# Patient Record
Sex: FEMALE | Race: WHITE | NOT HISPANIC OR LATINO | URBAN - NONMETROPOLITAN AREA
[De-identification: names, ages, dates, MRNs, and addresses within clinical notes are randomized per-mention and may not be internally consistent; named-entity substitution may affect disease eponyms.]

---

## 2016-12-29 LAB
EXTERNAL ABO GROUPING: (no result)
EXTERNAL RH FACTOR: POSITIVE

## 2017-01-05 ENCOUNTER — ROUTINE PRENATAL (OUTPATIENT)
Dept: OBSTETRICS AND GYNECOLOGY | Facility: CLINIC | Age: 27
End: 2017-01-05

## 2017-01-05 ENCOUNTER — PROCEDURE VISIT (OUTPATIENT)
Dept: OBSTETRICS AND GYNECOLOGY | Facility: CLINIC | Age: 27
End: 2017-01-05

## 2017-01-05 VITALS — SYSTOLIC BLOOD PRESSURE: 146 MMHG | WEIGHT: 175 LBS | BODY MASS INDEX: 32.01 KG/M2 | DIASTOLIC BLOOD PRESSURE: 78 MMHG

## 2017-01-05 DIAGNOSIS — O20.0 THREATENED ABORTION IN FIRST TRIMESTER: Primary | ICD-10-CM

## 2017-01-05 DIAGNOSIS — O20.0 THREATENED ABORTION IN FIRST TRIMESTER: ICD-10-CM

## 2017-01-05 DIAGNOSIS — Z3A.01 LESS THAN 8 WEEKS GESTATION OF PREGNANCY: Primary | ICD-10-CM

## 2017-01-05 LAB
COLOR UR: YELLOW
GLUCOSE UR STRIP-MCNC: NEGATIVE MG/DL
NITRITE UR-MCNC: NEGATIVE MG/ML
PROT UR STRIP-MCNC: NEGATIVE MG/DL
RBC # UR STRIP: NEGATIVE /UL

## 2017-01-05 PROCEDURE — 99214 OFFICE O/P EST MOD 30 MIN: CPT | Performed by: OBSTETRICS & GYNECOLOGY

## 2017-01-05 PROCEDURE — 81002 URINALYSIS NONAUTO W/O SCOPE: CPT | Performed by: OBSTETRICS & GYNECOLOGY

## 2017-01-05 PROCEDURE — 76817 TRANSVAGINAL US OBSTETRIC: CPT | Performed by: OBSTETRICS & GYNECOLOGY

## 2017-01-05 RX ORDER — PRENATAL VIT NO.126/IRON/FOLIC 28MG-0.8MG
1 TABLET ORAL DAILY
COMMUNITY
End: 2023-03-17

## 2017-01-05 NOTE — PROGRESS NOTES
Chief Complaint   Patient presents with   • Routine Prenatal Visit     has been spotting off and on since Tuesday.        HPI: Mis is a  currently at 7w5d who today reports the following:  Nausea - YES; Vaginal bleeding -  YES; Heartburn - No.  Pt is here for f/u from previous ultrasound on 2016 which showed gest sac at 6 5/7 wks with irregular shape appeared to be collapsing; no definitive yolk sac seen; no adnexal masses seen and no free fluid. Pt presents today for f/u with complaints of spotting on 1/3/2016 with wiping and again yesterday.  Pt with mild cramps but not severe; relieved with flatus.  Pt with +breast tenderness and nausea but no emesis.  Pt had labs done on 2016 which are reviewed with hcg level 29,270; MBT A+; TSH 2.10 normal.  Pt has been trying to conceive since July.  Pt has been on PNVS.  ROS:  Vitals: See prenatal flowsheet   GI: Constipation - No; Diarrhea - No    Neuro: Headache - No; Visual change - No      EXAM:  Abdomen: See prenatal flowsheet   Urine glucose/protein: See prenatal flowsheet   Pelvic: See prenatal flowsheet     Prenatal Labs  No results found for: HGB, RUBELLAIGGIN, RUBELLASCRN, HEPBSAG, LABRPR, ABORH, ABO, RH, ABSCRN, LABANTI, ABID, QPMIJWH93, SQQ2FQN0, HEPCVIRUSABY, DTRGEKV4KO, GBSANTIGEN, URINECX    MDM:  Impression: Probable missed ab.   Tests done today: U/S for threatened ab   Topics discussed: Ab precautions given. Pt offered to repeat hcg now if desired; pt declines.  Given above hcg level and findings on TVS then prob missed ab.  Offered repeat scan as well in 1 wk but pt declines.  Pt to f/u in 1 wk with plan at that time; if no miscarriage then may repeat scan and/or hcg level prior to suction D&C if desired.  All questions answered.  Instructions and precautions given.  Pt will need HTN addressed as well.   Tests next visit: none   Next visit: See prenatal flowsheet

## 2017-01-05 NOTE — MR AVS SNAPSHOT
Mis Wyatt   2017 4:10 PM   Appointment    Dept Phone:  [phone]   Encounter #:  [id number]    Provider:  ULTRASOUND ROOM CRISTA MITCHELL   Department:  [facility] OBSTETRICS AND GYNECOLOGY                Your Full Care Plan              Your Updated Medication List          This list is accurate as of: 17  5:26 PM.  Always use your most recent med list.                prenatal (CLASSIC) vitamin 28-0.8 MG tablet tablet               Instructions     None    Patient Instructions History      Upcoming Appointments     Visit Type Date Time Department    OB FOLLOWUP 2017  3:20 PM [facility]    OB ULTRASOUND 2017  4:10 PM [facility]    GYN FOLLOW UP 2017  2:30 PM E [facility]RISTA [facility]      MyChart Signup     [employer] allows you to send messages to your doctor, view your test results, renew your prescriptions, schedule appointments, and more. To sign up, go to [employer] and click on the Sign Up Now link in the New User? box. Enter your [email or web address] Activation Code exactly as it appears below along with the last four digits of your Social Security Number and your Date of Birth () to complete the sign-up process. If you do not sign up before the expiration date, you must request a new code.    [email or web address] Activation Code: [email or web address]  Expires: 2017  5:09 PM    If you have questions, you can email [email or web address] or call [phone] to talk to our [email or web address] staff. Remember, [email or web address] is NOT to be used for urgent needs. For medical emergencies, dial 911.               Other Info from Your Visit           Your Appointments     2017  2:30 PM EST   GYN FOLLOW UP with Stormy Lee MD   [facility] OBSTETRICS AND GYNECOLOGY (--)    [address] [place] [id number] [phone]              Allergies     No Known Allergies      Vital Signs     Last Menstrual Period  Smoking Status                10/26/2016 (Approximate) Never Smoker

## 2017-01-05 NOTE — MR AVS SNAPSHOT
Mis Wyatt   2017 3:20 PM   Routine Prenatal    Dept Phone:  [phone]   Encounter #:  [id number]    Provider:  Stormy Lee MD   Department:  [facility] OBSTETRICS AND GYNECOLOGY                Your Full Care Plan              Your Updated Medication List          This list is accurate as of: 17  5:11 PM.  Always use your most recent med list.                prenatal (CLASSIC) vitamin 28-0.8 MG tablet tablet               We Performed the Following     POC Urinalysis Dipstick     US Ob 14 + Weeks Single or First Gestation       You Were Diagnosed With        Codes Comments    Less than 8 weeks gestation of pregnancy    -  Primary ICD-10-CM: Z3A.01  ICD-9-CM: V22.2     Threatened  in first trimester     ICD-10-CM: O20.0  ICD-9-CM: 640.03       Instructions     None    Patient Instructions History      Upcoming Appointments     Visit Type Date Time Department    OB FOLLOWUP 2017  3:20 PM [facility]    GYN FOLLOW UP 2017  2:30 PM E [facility]E [facility]      [employer] Signup     [facility] [employer] allows you to send messages to your doctor, view your test results, renew your prescriptions, schedule appointments, and more. To sign up, go to [employer] and click on the Sign Up Now link in the New User? box. Enter your [employer] IP Activation Code exactly as it appears below along with the last four digits of your Social Security Number and your Date of Birth () to complete the sign-up process. If you do not sign up before the expiration date, you must request a new code.    [employer]teen [employer] Activation Code: [email or web address]  Expires: 2017  5:09 PM    If you have questions, you can email [email or web address] or call [phone] to talk to our [employer] staff. Remember, [employer] is NOT to be used for urgent needs. For medical emergencies, dial 911.               Other Info from Your Visit           Your Appointments     ,    2:30 PM EST   GYN FOLLOW UP with Stormy Lee MD   [facility] OBSTETRICS AND GYNECOLOGY (--)    [address] [address] [phone]              Allergies     No Known Allergies      Reason for Visit     Routine Prenatal Visit has been spotting off and on since Tuesday.       Vital Signs     Blood Pressure Weight Last Menstrual Period Body Mass Index Smoking Status       146/78 175 lb (79.4 kg) 10/26/2016 (Approximate) 32.01 kg/m2 Never Smoker       Problems and Diagnoses Noted     Pregnancy    Threatened  in first trimester          Results     POC Urinalysis Dipstick      Component Value Standard Range & Units    Color Yellow Yellow, Straw, Dark Yellow, Lula    Glucose, UA Negative Negative, 1000 mg/dL (3+) mg/dL    Blood, UA Negative Negative    Protein, POC Negative Negative mg/dL    Nitrite, UA Negative Negative

## 2017-01-05 NOTE — PATIENT INSTRUCTIONS
"Miscarriage  A miscarriage is the sudden loss of an unborn baby (fetus) before the 20th week of pregnancy. Most miscarriages happen in the first 3 months of pregnancy. Sometimes, it happens before a woman even knows she is pregnant. A miscarriage is also called a \"spontaneous miscarriage\" or \"early pregnancy loss.\" Having a miscarriage can be an emotional experience. Talk with your caregiver about any questions you may have about miscarrying, the grieving process, and your future pregnancy plans.  CAUSES   · Problems with the fetal chromosomes that make it impossible for the baby to develop normally. Problems with the baby's genes or chromosomes are most often the result of errors that occur, by chance, as the embryo divides and grows. The problems are not inherited from the parents.  · Infection of the cervix or uterus.    · Hormone problems.    · Problems with the cervix, such as having an incompetent cervix. This is when the tissue in the cervix is not strong enough to hold the pregnancy.    · Problems with the uterus, such as an abnormally shaped uterus, uterine fibroids, or congenital abnormalities.    · Certain medical conditions.    · Smoking, drinking alcohol, or taking illegal drugs.    · Trauma.    Often, the cause of a miscarriage is unknown.   SYMPTOMS   · Vaginal bleeding or spotting, with or without cramps or pain.  · Pain or cramping in the abdomen or lower back.  · Passing fluid, tissue, or blood clots from the vagina.  DIAGNOSIS   Your caregiver will perform a physical exam. You may also have an ultrasound to confirm the miscarriage. Blood or urine tests may also be ordered.  TREATMENT   · Sometimes, treatment is not necessary if you naturally pass all the fetal tissue that was in the uterus. If some of the fetus or placenta remains in the body (incomplete miscarriage), tissue left behind may become infected and must be removed. Usually, a dilation and curettage (D and C) procedure is performed. " During a D and C procedure, the cervix is widened (dilated) and any remaining fetal or placental tissue is gently removed from the uterus.  · Antibiotic medicines are prescribed if there is an infection. Other medicines may be given to reduce the size of the uterus (contract) if there is a lot of bleeding.  · If you have Rh negative blood and your baby was Rh positive, you will need a Rh immunoglobulin shot. This shot will protect any future baby from having Rh blood problems in future pregnancies.  HOME CARE INSTRUCTIONS   · Your caregiver may order bed rest or may allow you to continue light activity. Resume activity as directed by your caregiver.  · Have someone help with home and family responsibilities during this time.    · Keep track of the number of sanitary pads you use each day and how soaked (saturated) they are. Write down this information.    · Do not use tampons. Do not douche or have sexual intercourse until approved by your caregiver.    · Only take over-the-counter or prescription medicines for pain or discomfort as directed by your caregiver.    · Do not take aspirin. Aspirin can cause bleeding.    · Keep all follow-up appointments with your caregiver.    · If you or your partner have problems with grieving, talk to your caregiver or seek counseling to help cope with the pregnancy loss. Allow enough time to grieve before trying to get pregnant again.    SEEK IMMEDIATE MEDICAL CARE IF:   · You have severe cramps or pain in your back or abdomen.  · You have a fever.  · You pass large blood clots (walnut-sized or larger) or tissue from your vagina. Save any tissue for your caregiver to inspect.    · Your bleeding increases.    · You have a thick, bad-smelling vaginal discharge.  · You become lightheaded, weak, or you faint.    · You have chills.    MAKE SURE YOU:  · Understand these instructions.  · Will watch your condition.  · Will get help right away if you are not doing well or get worse.     This  information is not intended to replace advice given to you by your health care provider. Make sure you discuss any questions you have with your health care provider.     Document Released: 06/13/2002 Document Revised: 04/14/2014 Document Reviewed: 02/05/2013  [employer] Interactive Patient Education ©2016 [employer]

## 2017-01-12 ENCOUNTER — OFFICE VISIT (OUTPATIENT)
Dept: OBSTETRICS AND GYNECOLOGY | Facility: CLINIC | Age: 27
End: 2017-01-12

## 2017-01-12 VITALS
WEIGHT: 173 LBS | BODY MASS INDEX: 29.53 KG/M2 | DIASTOLIC BLOOD PRESSURE: 64 MMHG | SYSTOLIC BLOOD PRESSURE: 124 MMHG | HEIGHT: 64 IN

## 2017-01-12 DIAGNOSIS — O02.1 MISSED AB: Primary | ICD-10-CM

## 2017-01-12 LAB
APPEARANCE UR: CLEAR
BASOPHILS # BLD AUTO: 0.05 THOUS (ref 0–0.2)
BASOPHILS NFR BLD AUTO: 0.5 % (ref 0–2.5)
BILIRUB UR QL STRIP: NEGATIVE
COLOR UR: YELLOW
CONV ABS IMM GRAN: 0.05 THOUS (ref 0–0.6)
CONV IMMATURE GRAN: 0.5 % (ref 0–2.5)
CONV PROTEIN IN URINE BY AUTOMATED TEST STRIP: NEGATIVE
CONV UROBILINOGEN IN URINE BY AUTOMATED TEST STRIP: 0.2
EOSINOPHIL # BLD AUTO: 0.09 THOUS (ref 0–0.7)
EOSINOPHIL # BLD AUTO: 0.9 % (ref 0–7)
ERYTHROCYTE [DISTWIDTH] IN BLOOD BY AUTOMATED COUNT: 12.3 % (ref 11.5–14.5)
GLUCOSE UR QL: NEGATIVE
HCT VFR BLD AUTO: 44 % (ref 37–47)
HGB BLD-MCNC: 14.3 G/DL (ref 12–16)
HGB UR QL STRIP: NORMAL
KETONES UR QL STRIP: NEGATIVE
LEUKOCYTE ESTERASE UR QL STRIP: NEGATIVE
LYMPHOCYTES # BLD AUTO: 2.45 THOUS (ref 0.6–3.4)
LYMPHOCYTES NFR BLD AUTO: 25.7 % (ref 10–50)
MCH RBC QN AUTO: 30 UUG (ref 27–31)
MCHC RBC AUTO-ENTMCNC: 32.9 G/DL (ref 30–37)
MCV RBC AUTO: 91.4 FL (ref 81–99)
MONOCYTES # BLD AUTO: 0.49 THOUS (ref 0–0.9)
MONOCYTES NFR BLD MANUAL: 5.1 % (ref 0–12)
NEUTROPHILS # BLD MANUAL: 6.42 THOUS (ref 2–6.9)
NEUTROPHILS NFR BLD AUTO: 67.3 % (ref 37–80)
NITRITE UR QL STRIP: NEGATIVE
PH UR: 5.5 [PH] (ref 5–8)
PLATELET # BLD AUTO: 318 THOUS (ref 130–400)
RBC # BLD AUTO: 4.76 M/UL (ref 4.2–5.4)
RBC #/AREA URNS HPF: NORMAL /[HPF] (ref 0–3)
SP GR UR: 1.02 (ref 1–1.03)
SQUAMOUS SPT QL MICRO: NORMAL (ref 0–3)
WBC # BLD AUTO: 9.6 THOUS (ref 4.8–10.8)
WBC #/AREA URNS HPF: NORMAL /[HPF] (ref 0–3)

## 2017-01-12 PROCEDURE — 99214 OFFICE O/P EST MOD 30 MIN: CPT | Performed by: OBSTETRICS & GYNECOLOGY

## 2017-01-12 PROCEDURE — 76815 OB US LIMITED FETUS(S): CPT | Performed by: OBSTETRICS & GYNECOLOGY

## 2017-01-12 NOTE — MR AVS SNAPSHOT
"                        Mis Wyatt   2017 2:30 PM   Office Visit    Dept Phone:  [phone]   Encounter #:  [id number]    Provider:  Stormy Lee MD   Department:  [facility] OBSTETRICS AND GYNECOLOGY                Your Full Care Plan              Your Updated Medication List          This list is accurate as of: 17  3:57 PM.  Always use your most recent med list.                prenatal (CLASSIC) vitamin 28-0.8 MG tablet tablet               We Performed the Following     US Ob Limited 1 + Fetuses       You Were Diagnosed With        Codes Comments    Missed ab    -  Primary ICD-10-CM: O02.1  ICD-9-CM: 632       Instructions     None    Patient Instructions History      Upcoming Appointments     Visit Type Date Time Department    GYN FOLLOW UP 2017  2:30 PM [facility]      Moni Signup     [employer] allows you to send messages to your doctor, view your test results, renew your prescriptions, schedule appointments, and more. To sign up, go to [employer] and click on the Sign Up Now link in the New User? box. Enter your Moni Activation Code exactly as it appears below along with the last four digits of your Social Security Number and your Date of Birth () to complete the sign-up process. If you do not sign up before the expiration date, you must request a new code.    Moni Activation Code: [email or web address]  Expires: 2017  5:09 PM    If you have questions, you can email [email or web address] or call [phone] to talk to our Moni staff. Remember, Moni is NOT to be used for urgent needs. For medical emergencies, dial 911.               Other Info from Your Visit           Allergies     No Known Allergies      Reason for Visit     Follow-up follow up miscarriage      Vital Signs     Blood Pressure Height Weight Last Menstrual Period Breastfeeding? Body Mass Index    124/64 64\" (162.6 cm) 173 lb (78.5 kg) " 10/26/2016 (Approximate) Unknown 29.7 kg/m2    Smoking Status                   Never Smoker           Problems and Diagnoses Noted     Missed ab    -  Primary

## 2017-01-12 NOTE — PATIENT INSTRUCTIONS
Incomplete Miscarriage  A miscarriage is the sudden loss of an unborn baby (fetus) before the 20th week of pregnancy. In an incomplete miscarriage, parts of the fetus or placenta (afterbirth) remain in the body.   Having a miscarriage can be an emotional experience. Talk with your health care provider about any questions you may have about miscarrying, the grieving process, and your future pregnancy plans.  CAUSES   · Problems with the fetal chromosomes that make it impossible for the baby to develop normally. Problems with the baby's genes or chromosomes are most often the result of errors that occur by chance as the embryo divides and grows. The problems are not inherited from the parents.  · Infection of the cervix or uterus.  · Hormone problems.  · Problems with the cervix, such as having an incompetent cervix. This is when the tissue in the cervix is not strong enough to hold the pregnancy.  · Problems with the uterus, such as an abnormally shaped uterus, uterine fibroids, or congenital abnormalities.  · Certain medical conditions.  · Smoking, drinking alcohol, or taking illegal drugs.  · Trauma.  SYMPTOMS   · Vaginal bleeding or spotting, with or without cramps or pain.  · Pain or cramping in the abdomen or lower back.  · Passing fluid, tissue, or blood clots from the vagina.  DIAGNOSIS   Your health care provider will perform a physical exam. You may also have an ultrasound to confirm the miscarriage. Blood or urine tests may also be ordered.  TREATMENT   · Usually, a dilation and curettage (D&C) procedure is performed. During a D&C procedure, the cervix is widened (dilated) and any remaining fetal or placental tissue is gently removed from the uterus.  · Antibiotic medicines are prescribed if there is an infection. Other medicines may be given to reduce the size of the uterus (contract) if there is a lot of bleeding.  · If you have Rh negative blood and your baby was Rh positive, you will need a Rho (D)  immune globulin shot. This shot will protect any future baby from having Rh blood problems in future pregnancies.  · You may be confined to bed rest. This means you should stay in bed and only get up to use the bathroom.  HOME CARE INSTRUCTIONS   · Rest as directed by your health care provider.  · Restrict activity as directed by your health care provider. You may be allowed to continue light activity if curettage was not done but you require further treatment.  · Keep track of the number of pads you use each day. Keep track of how soaked (saturated) they are. Record this information.  · Do not  use tampons.  · Do not douche or have sexual intercourse until approved by your health care provider.  · Keep all follow-up appointments for reevaluation and continuing management.  · Only take over-the-counter or prescription medicines for pain, fever, or discomfort as directed by your health care provider.  · Take antibiotic medicine as directed by your health care provider. Make sure you finish it even if you start to feel better.  SEEK IMMEDIATE MEDICAL CARE IF:   · You experience severe cramps in your stomach, back, or abdomen.  · You have an unexplained temperature (make sure to record these temperatures).  · You pass large clots or tissue (save these for your health care provider to inspect).  · Your bleeding increases.  · You become light-headed, weak, or have fainting episodes.  MAKE SURE YOU:   · Understand these instructions.  · Will watch your condition.  · Will get help right away if you are not doing well or get worse.     This information is not intended to replace advice given to you by your health care provider. Make sure you discuss any questions you have with your health care provider.     Document Released: 12/18/2006 Document Revised: 01/08/2016 Document Reviewed: 07/17/2014  [employer] Interactive Patient Education ©2016 [employer]

## 2017-01-12 NOTE — PROGRESS NOTES
17    Mis Wyatt    1990      Chief Complaint   Patient presents with   • Follow-up     follow up miscarriage       Patient is 26 y.o.  here for follow-up of miscarriage.  The patient had previously been seen with a collapsing gestational sac.  The patient reports the onset of heavy bleeding on -yesterday. The patient reports bleeding heavily until yesterday.  The patient reports the bleeding had stopped with recurrent spotting this a.m.  The patient has had lower abdominal cramping as well.  She denies any fever or chills.  The patient has passed small clots but is not aware of passing any tissue.  The patient denies any discharge with a foul odor.  The patient had had a previous transvaginal ultrasound on  which showed a gestational sac which appeared to be collapsing measuring approximately 6 5/7 wks gestation. The patient had an hCG level at that time of 29,207.  The patient had a follow-up scan on 1/3/2016 which confirmed continued gestational sac irregular in shape, collapsing.      History reviewed. No pertinent past medical history.        Current Outpatient Prescriptions on File Prior to Visit   Medication Sig Dispense Refill   • Prenatal Vit-Fe Fumarate-FA (PRENATAL, CLASSIC, VITAMIN) 28-0.8 MG tablet tablet Take 1 tablet by mouth Daily.       No current facility-administered medications on file prior to visit.          Past Surgical History   Procedure Laterality Date   • Tonsillectomy             Social History     Social History   • Marital status:      Spouse name: N/A   • Number of children: N/A   • Years of education: N/A     Social History Main Topics   • Smoking status: Never Smoker   • Smokeless tobacco: Never Used   • Alcohol use No   • Drug use: No   • Sexual activity: Yes     Partners: Male     Other Topics Concern   • None     Social History Narrative         Review of Systems   Constitutional: Negative.    HENT: Negative.    Eyes: Negative.    Respiratory:  "Negative.    Cardiovascular: Negative.    Gastrointestinal: Negative.    Endocrine: Negative.    Genitourinary: Positive for vaginal discharge and vaginal pain.   Musculoskeletal: Negative.    Allergic/Immunologic: Negative.    Neurological: Negative.    Hematological: Negative.    Psychiatric/Behavioral: Negative.                  Vitals:    01/12/17 1517   BP: 124/64   Weight: 173 lb (78.5 kg)   Height: 64\" (162.6 cm)       Physical Exam   Constitutional: She is oriented to person, place, and time. She appears well-developed and well-nourished. No distress.   HENT:   Head: Normocephalic and atraumatic.   Eyes: Conjunctivae are normal.   Neck: Normal range of motion. Neck supple. No thyromegaly present.   Cardiovascular: Regular rhythm and normal heart sounds.    Pulmonary/Chest: Effort normal and breath sounds normal.   Abdominal: Soft. Bowel sounds are normal. She exhibits no distension and no mass. There is no tenderness. There is no rebound and no guarding.   Neurological: She is alert and oriented to person, place, and time.   Skin: Skin is warm and dry. She is not diaphoretic.   Psychiatric: She has a normal mood and affect. Her behavior is normal.   Nursing note and vitals reviewed.        1. Missed ab  Patient with missed AB with continued products of conception on transvaginal ultrasound which was performed today.  The uterus has a 1.2 cm or 5-5/7 weeks gestational sac seen in the fundus which appears much smaller than prior scan but still high in the fundus.  Cervix appears normal.  No adnexal masses seen and no free fluid.  The various options were discussed with the patient and decision was made to proceed with suction D&C.  Risks, complications, benefits, and other alternatives were discussed with the patient.  All questions have been answered.  And the patient is in agreement with the above plan.  - US Ob Limited 1 + Fetuses    Requested Prescriptions      No prescriptions requested or ordered in this " encounter       No Follow-up on file.    Stormy Lee MD

## 2017-01-13 ENCOUNTER — HOSPITAL ENCOUNTER (OUTPATIENT)
Dept: OTHER | Facility: HOSPITAL | Age: 27
Discharge: HOME | End: 2017-01-13
Attending: OBSTETRICS & GYNECOLOGY

## 2017-01-13 ENCOUNTER — PREP FOR SURGERY (OUTPATIENT)
Dept: OBSTETRICS AND GYNECOLOGY | Facility: CLINIC | Age: 27
End: 2017-01-13

## 2017-01-13 ENCOUNTER — OUTSIDE FACILITY SERVICE (OUTPATIENT)
Dept: OBSTETRICS AND GYNECOLOGY | Facility: CLINIC | Age: 27
End: 2017-01-13

## 2017-01-13 PROCEDURE — 59812 TREATMENT OF MISCARRIAGE: CPT | Performed by: OBSTETRICS & GYNECOLOGY

## 2017-01-13 NOTE — H&P
17     Mis Wyatt     1990             Chief Complaint   Patient presents with   • Follow-up       follow up miscarriage         Patient is 26 y.o.  here for follow-up of miscarriage. The patient had previously been seen with a collapsing gestational sac. The patient reports the onset of heavy bleeding on -yesterday. The patient reports bleeding heavily until yesterday. The patient reports the bleeding had stopped with recurrent spotting this a.m. The patient has had lower abdominal cramping as well. She denies any fever or chills. The patient has passed small clots but is not aware of passing any tissue. The patient denies any discharge with a foul odor. The patient had had a previous transvaginal ultrasound on  which showed a gestational sac which appeared to be collapsing measuring approximately 6 5/7 wks gestation. The patient had an hCG level at that time of 29,207. The patient had a follow-up scan on 1/3/2016 which confirmed continued gestational sac irregular in shape, collapsing.        History reviewed. No pertinent past medical history.           Current Outpatient Prescriptions on File Prior to Visit   Medication Sig Dispense Refill   • Prenatal Vit-Fe Fumarate-FA (PRENATAL, CLASSIC, VITAMIN) 28-0.8 MG tablet tablet Take 1 tablet by mouth Daily.          No current facility-administered medications on file prior to visit.                   Past Surgical History   Procedure Laterality Date   • Tonsillectomy                   Social History      Social History   • Marital status:        Spouse name: N/A   • Number of children: N/A   • Years of education: N/A            Social History Main Topics   • Smoking status: Never Smoker   • Smokeless tobacco: Never Used   • Alcohol use No   • Drug use: No   • Sexual activity: Yes       Partners: Male           Other Topics Concern   • None      Social History Narrative            Review of Systems   Constitutional: Negative.  "  HENT: Negative.   Eyes: Negative.   Respiratory: Negative.   Cardiovascular: Negative.   Gastrointestinal: Negative.   Endocrine: Negative.   Genitourinary: Positive for vaginal discharge and vaginal pain.   Musculoskeletal: Negative.   Allergic/Immunologic: Negative.   Neurological: Negative.   Hematological: Negative.   Psychiatric/Behavioral: Negative.                            Vitals:     01/12/17 1517   BP: 124/64   Weight: 173 lb (78.5 kg)   Height: 64\" (162.6 cm)         Physical Exam   Constitutional: She is oriented to person, place, and time. She appears well-developed and well-nourished. No distress.   HENT:   Head: Normocephalic and atraumatic.   Eyes: Conjunctivae are normal.   Neck: Normal range of motion. Neck supple. No thyromegaly present.   Cardiovascular: Regular rhythm and normal heart sounds.   Pulmonary/Chest: Effort normal and breath sounds normal.   Abdominal: Soft. Bowel sounds are normal. She exhibits no distension and no mass. There is no tenderness. There is no rebound and no guarding.   Neurological: She is alert and oriented to person, place, and time.   Skin: Skin is warm and dry. She is not diaphoretic.   Psychiatric: She has a normal mood and affect. Her behavior is normal.   Nursing note and vitals reviewed.           1. Missed ab  Patient with missed AB with continued products of conception on transvaginal ultrasound which was performed today. The uterus has a 1.2 cm or 5-5/7 weeks gestational sac seen in the fundus which appears much smaller than prior scan but still high in the fundus. Cervix appears normal. No adnexal masses seen and no free fluid. The various options were discussed with the patient and decision was made to proceed with suction D&C. Risks, complications, benefits, and other alternatives were discussed with the patient. All questions have been answered. And the patient is in agreement with the above plan.  - US Ob Limited 1 + Fetuses     Requested " Prescriptions        No prescriptions requested or ordered in this encounter         No Follow-up on file.     Stormy Lee MD

## 2017-01-16 NOTE — OP NOTE
[facility]     [address] [place] [address]     [phone]         OPERATIVE REPORT     [id number]         Signed        PATIENT NAME:  JONAS JASSO MRN:  [id number]    :  1990 ACCT#:  [id number]    ATTENDING:  THUAN DEVI MD ADMIT DATE:      PRIMARY CARE:  NONE LOCATION:  [facility]        CC:  THUAN DEVI MD; NONE         DATE OF PROCEDURE:  2017          OPERATIVE REPORT          PREOPERATIVE DIAGNOSIS: Missed .          POSTOPERATIVE DIAGNOSIS: Missed .          SURGEON: Thuan Devi MD           PROCEDURE PERFORMED: Suction dilatation and curettage.           ANESTHESIA: IV sedation with 1% lidocaine paracervical block.          ESTIMATED BLOOD LOSS: Less than 10 mL.           COMPLICATIONS: None.          INDICATIONS: The patient is a 26-year-old female, G1. Patient was at 8-5/7      weeks' gestation. Patient has been followed with serial ultrasounds with an      abnormal collapsing gestational sac with an HCG level of over 29,000. Patient      had onset of heavy bleeding approximately 1 week ago with significant      cramping. The patient had a repeat ultrasound which showed continued products      of conception in the fundus of the uterus at 1 week, and decision is made to      proceed with suction dilatation and curettage. Risks, complications, and      benefits, as well as other alternative treatments, were discussed with the      patient, and the patient elects to proceed with the above.            DESCRIPTION OF PROCEDURE: After adequate dosing of her anesthesia, the      patient had been prepped and draped in the usual sterile fashion. She was      placed in the dorsal lithotomy position using Roney stirrups. The bladder had      been drained with a red rubber catheter. Weighted speculum was placed in the      vagina. Anterior lip of the cervix was grasped with a single-tooth tenaculum.      Cervix was injected at the 3 and 9-o'clock positions  with 1% lidocaine plain      without any complications. The cervix was dilated completely with Contreras      dilators. Using a size 8 curette, suction curettage was performed with      removal of products of conception followed by sharp curettings with a good      cry throughout. Cervical tenaculum was removed. Cervix was noted to be      hemostatic at the end of the procedure after application of 2-0 chromic in a      figure-of-eight fashion. All instrument and sponge counts were correct at the      end of the procedure. The patient tolerated the procedure well. There were no      complications. She was taken to postoperative recovery room in stable      condition.                 Thuan Devi M.D.     GL/so 01/14/2017 20:44:26     so 01/14/2017 22:33:44     Job ID:   [id number]     Document ID: [id number]     Revised:  0     cc:              DICTATED BY:      THUAN DEVI MD    DICTATED DATE/TIME:      01/14/17 2044    TRANSCRIBED DATE/TIME:      01/14/17 2239        SIGNED:          THUAN DEVI MD       SIGNED DATE/TIME:      01/16/17 0719

## 2017-01-20 ENCOUNTER — OFFICE VISIT (OUTPATIENT)
Dept: OBSTETRICS AND GYNECOLOGY | Facility: CLINIC | Age: 27
End: 2017-01-20

## 2017-01-20 VITALS
BODY MASS INDEX: 33.23 KG/M2 | SYSTOLIC BLOOD PRESSURE: 150 MMHG | DIASTOLIC BLOOD PRESSURE: 50 MMHG | HEIGHT: 61 IN | WEIGHT: 176 LBS

## 2017-01-20 DIAGNOSIS — Z98.890 STATUS POST D&C: Primary | ICD-10-CM

## 2017-01-20 PROCEDURE — 99024 POSTOP FOLLOW-UP VISIT: CPT | Performed by: PHYSICIAN ASSISTANT

## 2017-01-20 RX ORDER — METRONIDAZOLE 500 MG/1
TABLET ORAL
Refills: 0 | COMMUNITY
Start: 2017-01-13 | End: 2023-03-17

## 2017-01-20 NOTE — PROGRESS NOTES
"Subjective   Chief Complaint   Patient presents with   • Post-op Follow-up     2017 Suction D&C     Visit Vitals   • /50   • Ht 61\" (154.9 cm)   • Wt 176 lb (79.8 kg)   • LMP 10/26/2016 (Approximate)   • BMI 33.25 kg/m2      Mis Wyatt is a 26 y.o. year old  presenting to be seen for post op visit  Patient is 1 week post op D&C for incomplete SAB  Patient is doing well--no bleeding or spotting  Normal bowel and bladder function  Path POC  History reviewed. No pertinent past medical history.     Current Outpatient Prescriptions:   •  metroNIDAZOLE (FLAGYL) 500 MG tablet, , Disp: , Rfl: 0  •  Prenatal Vit-Fe Fumarate-FA (PRENATAL, CLASSIC, VITAMIN) 28-0.8 MG tablet tablet, Take 1 tablet by mouth Daily., Disp: , Rfl:    No Known Allergies   Past Surgical History   Procedure Laterality Date   • Tonsillectomy     • Other surgical history  2017     Suction D&C      Social History     Social History   • Marital status:      Spouse name: N/A   • Number of children: N/A   • Years of education: N/A     Occupational History   • Not on file.     Social History Main Topics   • Smoking status: Never Smoker   • Smokeless tobacco: Never Used   • Alcohol use No   • Drug use: No   • Sexual activity: Yes     Partners: Male     Birth control/ protection: None     Other Topics Concern   • Not on file     Social History Narrative      Family History   Problem Relation Age of Onset   • Hypertension Father                 Objective     Physical Exam  Mis was seen today for post-op follow-up.    Diagnoses and all orders for this visit:    Status post D&C             This note was electronically signed.    Keli Adrian PA-C   2017  "

## 2017-01-20 NOTE — PATIENT INSTRUCTIONS
No conception for 3 months  Will continue prenatal vits  She declines contraception- will use condoms

## 2017-01-20 NOTE — MR AVS SNAPSHOT
Mis Wyatt   2017 10:40 AM   Office Visit    Dept Phone:  [phone]   Encounter #:  [id number]    Provider:  Keli Adrian PA-C   Department:  [facility]                Your Full Care Plan              Your Updated Medication List          This list is accurate as of: 17 11:21 AM.  Always use your most recent med list.                metroNIDAZOLE 500 MG tablet   Commonly known as:  FLAGYL       prenatal (CLASSIC) vitamin 28-0.8 MG tablet tablet               You Were Diagnosed With        Codes Comments    Status post D&C    -  Primary ICD-10-CM: Z98.890  ICD-9-CM: V45.89       Instructions    No conception for 3 months  Will continue prenatal vits  She declines contraception- will use condoms     Patient Instructions History      Upcoming Appointments     Visit Type Date Time Department    POST-OP 2017 10:40 AM [facility]      medidametrics Signup     [facility] medidametrics allows you to send messages to your doctor, view your test results, renew your prescriptions, schedule appointments, and more. To sign up, go to [employer] and click on the Sign Up Now link in the New User? box. Enter your medidametrics Activation Code exactly as it appears below along with the last four digits of your Social Security Number and your Date of Birth () to complete the sign-up process. If you do not sign up before the expiration date, you must request a new code.    medidametrics Activation Code: [email or web address]  Expires: 2017  5:36 AM    If you have questions, you can email [email or web address] or call [phone] to talk to our medidametrics staff. Remember, medidametrics is NOT to be used for urgent needs. For medical emergencies, dial 911.               Other Info from Your Visit           Allergies     No Known Allergies      Reason for Visit     Post-op Follow-up 2017 Suction D&C      Vital Signs     Blood  "Pressure Height Weight Last Menstrual Period Body Mass Index Smoking Status    150/50 61\" (154.9 cm) 176 lb (79.8 kg) 10/26/2016 (Approximate) 33.25 kg/m2 Never Smoker      Problems and Diagnoses Noted     Status post dilation and curettage    -  Primary        "

## 2018-01-11 RX ORDER — ACETAMINOPHEN AND CODEINE PHOSPHATE 120; 12 MG/5ML; MG/5ML
SOLUTION ORAL
Qty: 28 TABLET | Refills: 10 | OUTPATIENT
Start: 2018-01-11

## 2018-02-14 RX ORDER — ACETAMINOPHEN AND CODEINE PHOSPHATE 120; 12 MG/5ML; MG/5ML
SOLUTION ORAL
Qty: 28 TABLET | Refills: 10 | OUTPATIENT
Start: 2018-02-14

## 2022-07-19 ENCOUNTER — TRANSCRIBE ORDERS (OUTPATIENT)
Dept: ADMINISTRATIVE | Facility: HOSPITAL | Age: 32
End: 2022-07-19

## 2022-07-19 DIAGNOSIS — G89.29 CHRONIC RIGHT UPPER QUADRANT PAIN: ICD-10-CM

## 2022-07-19 DIAGNOSIS — R10.11 CHRONIC RIGHT UPPER QUADRANT PAIN: ICD-10-CM

## 2022-07-19 DIAGNOSIS — R10.11 ABDOMINAL PAIN, RIGHT UPPER QUADRANT: Primary | ICD-10-CM

## 2022-07-20 ENCOUNTER — HOSPITAL ENCOUNTER (OUTPATIENT)
Dept: ULTRASOUND IMAGING | Facility: HOSPITAL | Age: 32
Discharge: HOME | End: 2022-07-20
Admitting: NURSE PRACTITIONER

## 2022-07-20 DIAGNOSIS — G89.29 CHRONIC RIGHT UPPER QUADRANT PAIN: ICD-10-CM

## 2022-07-20 DIAGNOSIS — R10.11 ABDOMINAL PAIN, RIGHT UPPER QUADRANT: ICD-10-CM

## 2022-07-20 DIAGNOSIS — R10.11 CHRONIC RIGHT UPPER QUADRANT PAIN: ICD-10-CM

## 2022-07-20 PROCEDURE — 76705 ECHO EXAM OF ABDOMEN: CPT

## 2022-08-08 NOTE — PROGRESS NOTES
Patient: Mis Wyatt    YOB: 1990    Date: 08/10/2022    Primary Care Provider: Dixie Patrick APRN    Chief Complaint   Patient presents with   • Abdominal Pain       SUBJECTIVE:    History of present illness:  I saw the patient in the office today as a consultation for evaluation and treatment of abdominal pain.  The patient had an ultrasound that showed gallstones. States the pain has been going on for years.  Patient has several attacks, daily once a week.  Pain is minimal right shoulder.  Significant fatty food intolerance with bloating and pressure.  No significant heartburn symptoms.  Ultrasound indicates cholelithiasis.    The following portions of the patient's history were reviewed and updated as appropriate: allergies, current medications, past family history, past medical history, past social history, past surgical history and problem list.    Review of Systems   Constitutional: Negative for chills, fever and unexpected weight change.   HENT: Negative for hearing loss, trouble swallowing and voice change.    Eyes: Negative for visual disturbance.   Respiratory: Negative for apnea, cough, chest tightness, shortness of breath and wheezing.    Cardiovascular: Negative for chest pain, palpitations and leg swelling.   Gastrointestinal: Positive for abdominal pain. Negative for abdominal distention, anal bleeding, blood in stool, constipation, diarrhea, nausea, rectal pain and vomiting.   Endocrine: Negative for cold intolerance and heat intolerance.   Genitourinary: Negative for difficulty urinating, dysuria and flank pain.   Musculoskeletal: Negative for back pain and gait problem.   Skin: Negative for color change, rash and wound.   Neurological: Negative for dizziness, syncope, speech difficulty, weakness, light-headedness, numbness and headaches.   Hematological: Negative for adenopathy. Does not bruise/bleed easily.   Psychiatric/Behavioral: Negative for confusion. The patient  "is not nervous/anxious.        History:  Past Medical History:   Diagnosis Date   • Hypertension        Past Surgical History:   Procedure Laterality Date   • OTHER SURGICAL HISTORY  01/13/2017    Suction D&C   • TONSILLECTOMY         Family History   Problem Relation Age of Onset   • Diabetes Father    • Hypertension Father        Social History     Tobacco Use   • Smoking status: Never Smoker   • Smokeless tobacco: Never Used   Vaping Use   • Vaping Use: Never used   Substance Use Topics   • Alcohol use: No   • Drug use: No       Allergies:  No Known Allergies    Medications:    Current Outpatient Medications:   •  azelastine (ASTELIN) 0.1 % nasal spray, INHALE 1 OR 2 SPRAYS IN EACH NOSTRIL ONE TO  TWO TIMES A DAY, Disp: , Rfl:   •  hydroCHLOROthiazide (HYDRODIURIL) 25 MG tablet, Take 25 mg by mouth Daily., Disp: , Rfl:   •  levocetirizine (XYZAL) 5 MG tablet, , Disp: , Rfl:   •  metroNIDAZOLE (FLAGYL) 500 MG tablet, , Disp: , Rfl: 0  •  olopatadine (PATANOL) 0.1 % ophthalmic solution, INSTILL 1 DROP INTO THE AFFECTED EYE(S) TWO TIMES A DAY AS DIRECTED, Disp: , Rfl:   •  Prenatal Vit-Fe Fumarate-FA (PRENATAL, CLASSIC, VITAMIN) 28-0.8 MG tablet tablet, Take 1 tablet by mouth Daily., Disp: , Rfl:   •  Sharobel 0.35 MG tablet, Take 1 tablet by mouth Daily., Disp: , Rfl:     OBJECTIVE:    Vital Signs:   Vitals:    08/10/22 0831   Pulse: (!) 140   Resp: 18   Temp: 97.6 °F (36.4 °C)   TempSrc: Temporal   SpO2: 97%   Weight: 81.2 kg (179 lb)   Height: 154.9 cm (61\")       Physical Exam:   General Appearance:    Alert, cooperative, in no acute distress   Head:    Normocephalic, without obvious abnormality, atraumatic   Eyes:            Lids and lashes normal, conjunctivae and sclerae normal, no   icterus, no pallor, corneas clear, PERRLA   Ears:    Ears appear intact with no abnormalities noted   Throat:   No oral lesions, no thrush, oral mucosa moist   Neck:   No adenopathy, supple, trachea midline, no thyromegaly, no   " carotid bruit, no JVD   Lungs:     Clear to auscultation,respirations regular, even and                  unlabored    Heart:    Regular rhythm and normal rate, normal S1 and S2, no            murmur   Abdomen:     no masses, no organomegaly, soft non-tender, non-distended, no guarding, there is evidence of right upper quadrant tenderness.  No abdominal wall hernias or inguinal hernias.    Extremities:   Moves all extremities well, no edema, no cyanosis, no             redness   Pulses:   Pulses palpable and equal bilaterally   Skin:   No bleeding, bruising or rash   Lymph nodes:   No palpable adenopathy   Neurologic:   Cranial nerves 2 - 12 grossly intact, sensation intact      Results Review:   I reviewed the patient's new clinical results.    Review of Systems was reviewed and confirmed as accurate as documented by the MA.    ASSESSMENT/PLAN:    1. Calculus of gallbladder with chronic cholecystitis without obstruction    2. Right upper quadrant abdominal pain        I had a detailed and extensive discussion with the patient in the office and they understand that they need to undergo laparoscopic cholecystectomy with intraoperative cholangiography or possible open cholecystectomy. Full risks and benefits of operative versus nonoperative intervention were discussed with the patient and these included things such as nonresolution of symptoms and possible worsening of symptoms without surgical intervention versus infection, bleeding, open cholecystectomy, common bile duct injury, postoperative biliary leakage, need for drain placement, possible inability to perform cholangiography due to inflammation, postoperative abscess, etc with surgical intervention. The patient understands, agrees, and wishes to proceed with the surgical treatment plan as mentioned above. The patient had no questions for me at the end of the discussion.  I did draw a picture of the anatomy for the patient and used this in my informed consent.   Patient maintained a low-fat diet in interim.     I discussed the patients findings and my recommendations with patient.    Electronically signed by Emiliano Matias MD  08/10/22

## 2022-08-10 ENCOUNTER — OFFICE VISIT (OUTPATIENT)
Dept: SURGERY | Facility: CLINIC | Age: 32
End: 2022-08-10

## 2022-08-10 VITALS
RESPIRATION RATE: 18 BRPM | TEMPERATURE: 97.6 F | HEART RATE: 140 BPM | OXYGEN SATURATION: 97 % | WEIGHT: 179 LBS | BODY MASS INDEX: 33.79 KG/M2 | HEIGHT: 61 IN

## 2022-08-10 DIAGNOSIS — R10.11 RIGHT UPPER QUADRANT ABDOMINAL PAIN: ICD-10-CM

## 2022-08-10 DIAGNOSIS — Z01.818 PREOP TESTING: Primary | ICD-10-CM

## 2022-08-10 DIAGNOSIS — K80.10 CALCULUS OF GALLBLADDER WITH CHRONIC CHOLECYSTITIS WITHOUT OBSTRUCTION: Primary | ICD-10-CM

## 2022-08-10 PROCEDURE — 99203 OFFICE O/P NEW LOW 30 MIN: CPT | Performed by: SURGERY

## 2022-08-10 RX ORDER — LEVOCETIRIZINE DIHYDROCHLORIDE 5 MG/1
TABLET, FILM COATED ORAL
COMMUNITY
Start: 2022-07-31

## 2022-08-10 RX ORDER — OLOPATADINE HYDROCHLORIDE 1 MG/ML
SOLUTION/ DROPS OPHTHALMIC
COMMUNITY
Start: 2022-06-10

## 2022-08-10 RX ORDER — NORETHINDRONE 0.35 MG
1 KIT ORAL DAILY
COMMUNITY
Start: 2022-05-26

## 2022-08-10 RX ORDER — HYDROCHLOROTHIAZIDE 25 MG/1
25 TABLET ORAL DAILY
COMMUNITY
Start: 2022-06-10 | End: 2023-03-17 | Stop reason: ALTCHOICE

## 2022-08-10 RX ORDER — AZELASTINE 1 MG/ML
SPRAY, METERED NASAL
COMMUNITY
Start: 2022-06-10 | End: 2023-03-17 | Stop reason: SDUPTHER

## 2022-08-11 ENCOUNTER — TELEPHONE (OUTPATIENT)
Dept: SURGERY | Facility: CLINIC | Age: 32
End: 2022-08-11

## 2022-08-11 ENCOUNTER — PATIENT ROUNDING (BHMG ONLY) (OUTPATIENT)
Dept: SURGERY | Facility: CLINIC | Age: 32
End: 2022-08-11

## 2022-08-11 NOTE — PROGRESS NOTES
August, 10, 2022     Hello, may I speak with Mis Wyatt?    My name is ERNA WALTON      I am  with [facility]  [address]  [place] [address] [phone].    Before we get started may I verify your date of birth? 1990    I am calling to officially welcome you to our practice and ask about your recent visit. Is this a good time to talk? yes    Tell me about your visit with us. What things went well?  EVERYTHING WAS VERY GOOD.       We're always looking for ways to make our patients' experiences even better. Do you have recommendations on ways we may improve?  no    Overall were you satisfied with your first visit to our practice? yes       I appreciate you taking the time to speak with me today. Is there anything else I can do for you? no      Thank you, and have a great day.

## 2022-08-16 ENCOUNTER — OUTSIDE FACILITY SERVICE (OUTPATIENT)
Dept: SURGERY | Facility: CLINIC | Age: 32
End: 2022-08-16

## 2022-08-16 DIAGNOSIS — K81.9 CHOLECYSTITIS: Primary | ICD-10-CM

## 2022-08-16 PROCEDURE — 47563 LAPARO CHOLECYSTECTOMY/GRAPH: CPT | Performed by: SURGERY

## 2022-08-16 RX ORDER — HYDROCODONE BITARTRATE AND ACETAMINOPHEN 7.5; 325 MG/1; MG/1
1 TABLET ORAL EVERY 6 HOURS PRN
Qty: 14 TABLET | Refills: 0 | Status: SHIPPED | OUTPATIENT
Start: 2022-08-16 | End: 2023-03-17

## 2022-08-19 ENCOUNTER — TELEPHONE (OUTPATIENT)
Dept: SURGERY | Facility: CLINIC | Age: 32
End: 2022-08-19

## 2022-08-19 NOTE — TELEPHONE ENCOUNTER
[facility] [address] [facility] initial consultation note    Chuy Car                                                Chief complaint: Chuy Car is a 27 y. o.oldmale came for further evaluation regarding his ? Nocturnal seizures and sleep apnea  with referral from Ms. Elba Claros CNP/Dr. Jeannine Kincaid MD      [place]:    Sleep/Wake schedule:  Usual time to go to bed during the work/regular day of week: 3 AM.  He tried to go to bed at 11 PM and not able to go to sleep until 3 AM.  Usual time to wake up during the work//regular day of week: 7 AM.  He wakes up at 7 AM to take care of her children and send them to school. His children are 1 year, 5-year and 9years old. His 3year-old child (daughter) sleeps in the same bedroom as his. Over the weekends his sleep schedule:   [x]phase delayed. He feels the same on the weekends despite sleeping long time. He usually falls a sleep in less than: It takes up to 2 hours to go to sleep. He takes naps: Yes. Number of naps per week: 1 time. He takes naps especially when he had migraine headaches  During each nap he spends a total of: 2 hours  The naps were reported as refreshing: NO    Sleep Hygiene:    Is the temperature and evironment in his bed room is acceptable to him: Yes. He watches Television in his bed room: No.  He read books, study, pay bills etc in the bed: No.  Frequency He wake up during night/sleep: 5-6 times  Majority of nocturnal awakenings are for urination: Yes. He wakes up 1 time to go to restroom. Difficulty in falling back to sleep after nocturnal awakenings: No  .  Do you drink coffee: Yes. He drinks 2 cups of coffee per day. He drinks coffee in the morning. Do you drink caffeinated beverages i.e sodas: Yes. He drinks 2 cans of [employer] [facility] per day.   He drinks his last caffeinated soda around 5 PM.  Do you drink tea:No.   Do you drink alcoholic beverages: No.  History of recreational Pt is s/p anais guzman done 08/16/2022, she wanted to know if it is ok to take Mucinex and her multivitamin, I told her it is ok to take as directed.   syndrome : NO.     History regarding old sleep studies:  Prior history of sleep study: No.  Using CPAP device: No.  Currently using home Oxygen: NO.        Patient considerations:  Is the patient is ambulatory: Yes  Patient is currently using: None of these Wheelchair, Lynnda Leventhal or [employer]. Para/Quadriplegic: NO  Hearing deficit : NO  Claustrophobic: NO  MDD : NO  Blind: NO  Incontinent: NO  Para/Quadraplegi: NO.   Need transportation to and from Sleep Center:NO      Social History:  Social History     Tobacco Use    Smoking status: Never Smoker    Smokeless tobacco: Never Used   Vaping Use    Vaping Use: Never used   Substance Use Topics    Alcohol use: Never    Drug use: Never   . He is currently working: No.  [x]Disabled. He is currently on disability due to seizures.                            Past Medical History:   Diagnosis Date    Hypertension     Seizures ([facility] [address]     Type 2 diabetes mellitus ([facility] [address]        Past Surgical History:   Procedure Laterality Date    FOOT SURGERY Right     benign mass    GASTRIC FUNDOPLICATION  43/41/0814    Dr. Laurie Sadler (in care everywhere)    HERNIA REPAIR N/A 8/11/2021    LAPAROSCOPIC INCISIONAL HERNIA REPAIR WITH MESH, POSS OPEN performed by Jacquelyn Renner DO at [facility]      left arm mass removed-- Dr. Laurie Sadler       Allergies   Allergen Reactions    Dust Mite Extract     Gabapentin Hives    Mixed Grasses     Molds & Smuts        Current Outpatient Medications   Medication Sig Dispense Refill    montelukast (SINGULAIR) 10 MG tablet Take 10 mg by mouth daily      metoprolol succinate (TOPROL XL) 25 MG extended release tablet Take 25 mg by mouth daily      omeprazole (PRILOSEC) 20 MG delayed release capsule Take 40 mg by mouth daily      insulin glargine (LANTUS) 100 UNIT/ML injection vial Inject into the skin nightly 42 in morning 32 at night      insulin lispro (HUMALOG) 100 UNIT/ML injection vial Inject into the skin 3 times daily (before meals)      tiZANidine (ZANAFLEX) 4 MG tablet Take 4 mg by mouth every 6 hours as needed      ondansetron (ZOFRAN-ODT) 4 MG disintegrating tablet Take 4 mg by mouth every 8 hours as needed for Nausea or Vomiting      acetaminophen (TYLENOL) 325 MG tablet Take 650 mg by mouth every 6 hours as needed for Pain      ibuprofen (ADVIL;MOTRIN) 200 MG tablet Take 200 mg by mouth every 6 hours as needed for Pain       No current facility-administered medications for this visit. Family History   Problem Relation Age of Onset    Diabetes Mother     No Known Problems Father     Irritable Bowel Syndrome Sister    [facility] Migraines Sister         Review of Systems:   General/Constitutional: No recent loss of weight or appetite changes. No fever or chills. HENT: Negative. Eyes: Negative. Upper respiratory tract: No nasal stuffiness or post nasal drip. Lower respiratory tract/ lungs: No cough or sputum production. No hemoptysis. Cardiovascular: No palpitations or chest pain. Gastrointestinal: No nausea or vomiting. Neurological: No focal neurologiacal weakness. Extremities: No edema. Musculoskeletal: No complaints. Genitourinary: No complaints. Hematological: Negative. Psychiatric/Behavioral: Negative. Skin: No itching. /86 (Site: Right Upper Arm, Position: Sitting, Cuff Size: Medium Adult)   Pulse 84   Temp 98.1 °F (36.7 °C) (Skin)   Ht 6' 2\" (1.88 m)   Wt 257 lb 9.6 oz (116.8 kg)   SpO2 98%   BMI 33.07 kg/m²   Mallampati airway Class: 4  Neck Circumference: 18.75 inches  [place] sleepiness score 11/12/21:1  ( On further questioning he gives a history of hypersomnia ( Excessive daytime sleepiness) with daytime sleepy attacks. No reported motor vehicle accidents due to his sleepiness). SAQLI:50      Physical Exam   Nursing note and vitals reviewed. Constitutional: Patient appears moderately built and moderately nourished. No distress.  Patient is oriented to person, place, and time.  HENT:   Head: Normocephalic and atraumatic. Right Ear: External ear normal.   Left Ear: External ear normal.   Mouth/Throat: Oropharynx is clear and moist.  No oral thrush. Eyes: Conjunctivae are normal. Pupils are equal, round, and reactive to light. No scleral icterus. Neck: Neck supple. No JVD present. No tracheal deviation present. Cardiovascular: Normal rate, regular rhythm, normal heart sounds. No murmur heard. Pulmonary/Chest: Effort normal and breath sounds normal. No stridor. No respiratory distress. No wheezes. No rales. Patient exhibits no tenderness. Abdominal: Soft. Patient exhibits no distension. No tenderness. Musculoskeletal: Normal range of motion. Extremities: Patient exhibits no edema and no tenderness. Lymphadenopathy:  No cervical adenopathy. Neurological: Patient is alert and oriented to person, place, and time. Skin: Skin is warm and dry. Patient is not diaphoretic. Psychiatric: Patient  has a normal mood and affect. Patient behavior is normal.     Diagnostic Data:  None related sleep. EEG: Performed on 04/30/2014            [address] [place] [address] (EEG)                              [address] [place] [address]                                  [phone]                                       EEG REPORT     PATIENT NAME: Therese Bryant  DATE: 04/30/2014  MEDICAL RECORD NO: [id number]  ROOM NO: OP  ACCOUNT NO: [de-identified]  REFERRING DOCTOR: Zulay Chau M.D. HISTORY:  39-year-old male with history of two seizures, one in August and  another one in October 2013. He has diabetes mellitus since age 13 and two  days prior to admission had hypoglycemia down in the 30s, unable to awaken,  and the patient was poorly responsive. MEDICATIONS: Currently on Dilantin, Lantus, and Humalog.      REPORT:  Sixteen channel digital EEG data recorded using the 10/20 system. Channels also dedicated to EKG and ocular movement. Background occipital  alpha rhythm 9-10 Hz in frequency and 10-40 microvolts in amplitude. It is  well-organized and symmetric between both hemispheres. There is normal  attenuation to eye opening. Hyperventilation produced mild slowness of  background activity. Later the patient became drowsy and mainly in Stage I  of sleep, but did not enter into Stage II of sleep. Irregular interference  artifacts identified. Photic stimulation produced minimal driving. No  epileptic activity seen. IMPRESSION:  Normal EEG awake and drowsy. Tiffanie Dent M.D.        D: 2014 20:25                                    T: 2014 20:58  cd    MRI of brain with out contrast:  [id number]      MRI Brain B Stem WO Contr   2014 10:24:44 AM       cerebellar tonsillar ectopia. The sellar contents appear unremarkable. IMPRESSION:  Unremarkable MR appearance of the brain. Electroencephalogram performed on 2021:       ELECTROENCEPHALOGRAM REPORT     PATIENT NAME: Licha Mae                       :        1989  MED REC NO:   [id number]                           ROOM:  ACCOUNT NO:   [de-identified]                           ADMIT DATE: 10/20/2021  PROVIDER:     Sade Mendoza. Skyler Thompson MD     DATE OF EEG:  10/20/2021     REFERRING PROVIDER:  Randall Trinidad CNP    IMPRESSION:  This is a normal awake and sleep EEG. There was no  evidence of epileptiform activity appreciated. Assessment:  -Snoring without witnessed apneas,frequent nocturnal awakenings and excessive daytime sleepiness to evaluate for obstructive sleep apnea. -?  Nocturnal seizures diagnosed in 2021 at [facility] emergency room. He went to bed and woke up in the ER. He was treated for nocturnal seizures with antiseizure medications in the past.  He is currently off antiseizure medications.   His EEG was normal on 10/20/2021  -Inadequate sleep hygiene.  -Essential hypertension on treatment with medications.  -Type 2 diabetes mellitus.  -Allergic rhinitis on treatment with the Singulair. He follows with the family physician.  -Chronic back pain. He is on treatment with Zanaflex.  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 -GERD on treatment with PPI. He underwent? Fundoplication by a general surgeon in [facility]  -Incisional hernia. Patient underwent laparoscopic incisional hernia repair by Dr. Rosy Nolasco MD.      Recommendations/Plan:  -Stop Singulair. I did not see any clear indication for this medication at this time.  -Start patient on Melatonin 3mg PO daily at bed time PRN. He was instructed to not to drive any motor vehicles or operate heavy equipment after taking the pill until sleep symptoms are under control. He was detailed about mechanism of action of drug along with associated side effects. He agreed to take the risk and medication. He verbalizes understanding.  -Will schedule patient for polysomnogram in the sleep lab with a seizure montage.   -I had a discussion with patient regarding avialable treatment options for his sleep disorder breathing including but not limited to CPAP titration in the sleep lab Vs.Dental appliance placement with referral to a local dentist Vs other available surgical options including Uvulopalatopharyngoplasty, maxillomandibular ostomy and tracheostomy as last option. At the end of discussion, he is not decided on his treatment if he found to have obstructive sleep apnea at this time. -Juan José Clemente was educated about Chronotherapy for Circadian sleep disorder with phase delay and mechanism. He was advised to start Chronotherapy to maintain desired sleep schedule. -He was educated about sleep restriction therapy and advised to practice to improve his insomnia. -He was educated about stimulus control therapy and advise to practice to improve his insomnia.   -We will see Juan José Patriciasreedhar back in 1week after the sleep study to go over the sleep study results and further management options.  -He was educated to practice good sleep hygiene practices. He was provided with a good sleep hygiene hand out. Cody Bains was advised to make earlier appointment with my clinic if he develops any worsening of sleep symptoms. He verbalizes understanding.  -Elvin Cesar was advised to not to drive any motor vehicles or operate heavy equipment until his sleep symptoms are under good control. Inga Pietro verbalizes understanding.  -He was advised to loose weight by controlling diet and doing exercise once cleared by his family physician. - Inga Westbrook was educated about my impression and plan. He verbalizes understanding.      -I personally reviewed updated the Past medical hx, Past surgical hx,Social hx, Family hx, Medications, Allergies in the discrete data section of the patient chart along with labs, Pulmonary medicine,Sleep medicine related, Pathological, Microbiological and Radiological investigations.

## 2022-08-25 NOTE — PROGRESS NOTES
"Patient: Mis Wyatt    YOB: 1990    Date: 08/26/2022    Primary Care Provider: Dixie Patrick APRN    Chief Complaint   Patient presents with   • Post-op     Lap alona       History of present illness:  I saw the patient in the office today as a followup from their recent laparoscopic cholecystectomy.  They state that they have done well and are having no complaints.    Vital Signs:   Vitals:    08/26/22 1259   BP: 132/78   Pulse: 69   Resp: 18   Temp: 98.7 °F (37.1 °C)   TempSrc: Temporal   SpO2: 100%   Height: 154.9 cm (61\")       Physical Exam:   General Appearance:    Alert, cooperative, in no acute distress   Abdomen:     no masses, no organomegaly, soft non-tender, non-distended, no guarding, wounds are well healed     Assessment / Plan :    1. Postoperative visit        I did discuss the situation with the patient today in the office and they have done well from their recent laparoscopic cholecystectomy.  I have released the patient back to normal activity, they understand that they need to be careful about heavy lifting.  I need to see the patient back in the office only if they are having further problems, they know to call me if they are.    Electronically signed by Emiliano Matias MD  08/26/22                  "

## 2022-08-26 ENCOUNTER — OFFICE VISIT (OUTPATIENT)
Dept: SURGERY | Facility: CLINIC | Age: 32
End: 2022-08-26

## 2022-08-26 VITALS
DIASTOLIC BLOOD PRESSURE: 78 MMHG | RESPIRATION RATE: 18 BRPM | HEART RATE: 69 BPM | OXYGEN SATURATION: 100 % | BODY MASS INDEX: 33.82 KG/M2 | TEMPERATURE: 98.7 F | SYSTOLIC BLOOD PRESSURE: 132 MMHG | HEIGHT: 61 IN

## 2022-08-26 DIAGNOSIS — Z48.89 POSTOPERATIVE VISIT: Primary | ICD-10-CM

## 2022-08-26 PROCEDURE — 99024 POSTOP FOLLOW-UP VISIT: CPT | Performed by: SURGERY

## 2023-03-17 ENCOUNTER — OFFICE VISIT (OUTPATIENT)
Dept: INTERNAL MEDICINE | Facility: CLINIC | Age: 33
End: 2023-03-17
Payer: COMMERCIAL

## 2023-03-17 VITALS
DIASTOLIC BLOOD PRESSURE: 84 MMHG | WEIGHT: 181 LBS | HEIGHT: 61 IN | HEART RATE: 118 BPM | OXYGEN SATURATION: 98 % | TEMPERATURE: 98 F | BODY MASS INDEX: 34.17 KG/M2 | SYSTOLIC BLOOD PRESSURE: 128 MMHG

## 2023-03-17 DIAGNOSIS — F41.9 ANXIETY: ICD-10-CM

## 2023-03-17 DIAGNOSIS — E78.5 HYPERLIPIDEMIA, UNSPECIFIED HYPERLIPIDEMIA TYPE: ICD-10-CM

## 2023-03-17 DIAGNOSIS — G43.109 MIGRAINE WITH AURA AND WITHOUT STATUS MIGRAINOSUS, NOT INTRACTABLE: Primary | ICD-10-CM

## 2023-03-17 DIAGNOSIS — E55.9 VITAMIN D DEFICIENCY: ICD-10-CM

## 2023-03-17 DIAGNOSIS — R53.83 FATIGUE, UNSPECIFIED TYPE: ICD-10-CM

## 2023-03-17 DIAGNOSIS — I10 PRIMARY HYPERTENSION: ICD-10-CM

## 2023-03-17 PROCEDURE — 99204 OFFICE O/P NEW MOD 45 MIN: CPT | Performed by: FAMILY MEDICINE

## 2023-03-17 RX ORDER — AZELASTINE 1 MG/ML
2 SPRAY, METERED NASAL 2 TIMES DAILY
Qty: 30 ML | Refills: 5 | Status: SHIPPED | OUTPATIENT
Start: 2023-03-17

## 2023-03-17 RX ORDER — PROPRANOLOL HCL 60 MG
60 CAPSULE, EXTENDED RELEASE 24HR ORAL DAILY
Qty: 90 CAPSULE | Refills: 3 | Status: SHIPPED | OUTPATIENT
Start: 2023-03-17 | End: 2023-04-17 | Stop reason: ALTCHOICE

## 2023-03-17 NOTE — ASSESSMENT & PLAN NOTE
We will start the patient on propranolol long acting. May continue over the counter analgesics for quick relief.

## 2023-03-17 NOTE — ASSESSMENT & PLAN NOTE
The patient reports doing better on the beta blocker than hydrochlorothiazide. We will discontinue hydrochlorothiazide and start propranolol as hypertension seems to be primarily anxiety induced.

## 2023-03-17 NOTE — PROGRESS NOTES
"Mis Wyatt is a 32 y.o. female.    Chief Complaint   Patient presents with   • Hypertension       HPI     Mis Wyatt is a 32-year-old female who presents today to establish care and to follow up on hypertension.    Patient has hypertension. She is taking hydrochlorothiazide She has been compliant with medications.The patient denies any side effects to the medication; however, she reports that her previous beta-blocker medication was more effective towards relieving extra symptoms like headaches and anxiety. Blood pressure is controlled in the office today. She has a resting heart rate of 70 beats per minute and an active heart rate around 90 beats per minute. The patient reported having a lower heart rate when she took previous the medication that did not make her feel well. She requests to take less medication in the future. Blood pressure has been running approximately 125 mmHg systolic blood pressure. Her systolic blood pressure has been previously elevated up to 140 and 160 mmHg only in the medical office. She is not following a low salt diet.  She is not currently active.    The patient complains of experiencing headaches twice per week. She takes over-the-counter pain medication. She does admit to light and sound sensitivity, and she sees \"flashes\" of visual \"squiggles\" prior to her migraines. The patient experiences nausea and dizziness. She reported having 2 episodes of dizziness and headache in the spring of 2022 that caused her to vomit during her trip. The patient was evaluated by ENT several months ago for potential fluid in her ears, but she had no abnormalities noted.    The patient confirms using Astelin 2 sprays per nostril daily, and she requests to obtain a refill. She has been using saline rinse for several months without any Astelin. She confirms taking Xyzal daily and using olopatadine eyedrops as prescribed by her allergist for seasonal allergies.    The patient complains of increasing chest " pain that is potentially associated with anxiety. She reports feeling symptoms of pressure and burning. She states that her symptoms have worsened overall, and they were constant for a few months after her father's passing.    The patient's most recent fasting lab results as of 02/22/2022 note that her alkaline phosphatase measured 130 IU/L. Her ALT and AST measured within normal limits at 30 IU/L and 23 IU/L respectively. She had an elevated LDL of 160 mg/dL and a normal HDL 59 mg/dL. The patient was also noted for vitamin D insufficiency and was prescribed 50,000 IU weekly for 12 weeks. She does admit to having increased fatigue.    The patient reports taking Sharobel for birth control.    She reports that her face gets flushed when she is neither hot or cold. She has a history of being noted for urinary retention. She reportedly sits in the bathroom for over 10 minutes.      The following portions of the patient's history were reviewed and updated as appropriate: allergies, current medications, past family history, past medical history, past social history, past surgical history and problem list.     Past Medical History:   Diagnosis Date   • Allergic    • Anxiety    • Cholelithiasis July 2022   • Headache    • Hypertension        Past Surgical History:   Procedure Laterality Date   • ADENOIDECTOMY     • LAPAROSCOPIC CHOLECYSTECTOMY  08/16/2022   • OTHER SURGICAL HISTORY  01/13/2017    Suction D&C   • TONSILLECTOMY         Family History   Problem Relation Age of Onset   • Depression Mother    • Atrial fibrillation Mother    • Diabetes Father    • Hypertension Father    • Early death Father         unknown   • Hearing loss Father    • Clotting disorder Father    • Asthma Sister    • Heart failure Maternal Grandmother        Social History     Socioeconomic History   • Marital status:    Tobacco Use   • Smoking status: Never     Passive exposure: Past   • Smokeless tobacco: Never   Vaping Use   • Vaping  "Use: Never used   Substance and Sexual Activity   • Alcohol use: No   • Drug use: No   • Sexual activity: Yes     Partners: Male     Birth control/protection: Birth control pill       No Known Allergies      Current Outpatient Medications:   •  azelastine (ASTELIN) 0.1 % nasal spray, 2 sprays into the nostril(s) as directed by provider 2 (Two) Times a Day. Use in each nostril as directed, Disp: 30 mL, Rfl: 5  •  levocetirizine (XYZAL) 5 MG tablet, , Disp: , Rfl:   •  olopatadine (PATANOL) 0.1 % ophthalmic solution, INSTILL 1 DROP INTO THE AFFECTED EYE(S) TWO TIMES A DAY AS DIRECTED, Disp: , Rfl:   •  Sharobel 0.35 MG tablet, Take 1 tablet by mouth Daily., Disp: , Rfl:   •  propranolol LA (Inderal LA) 60 MG 24 hr capsule, Take 1 capsule by mouth Daily., Disp: 90 capsule, Rfl: 3    ROS    Review of Systems   Constitutional: Positive for fatigue. Negative for chills and fever.   HENT: Positive for congestion and postnasal drip. Negative for sore throat.    Eyes: Negative for blurred vision and visual disturbance.   Respiratory: Negative for cough, shortness of breath and wheezing.    Cardiovascular: Positive for chest pain. Negative for leg swelling.   Gastrointestinal: Positive for nausea (migraine associated) and vomiting (migraine associated). Negative for abdominal pain, constipation and diarrhea.   Endocrine: Positive for cold intolerance. Negative for heat intolerance.   Genitourinary: Negative for dysuria and frequency.   Allergic/Immunologic: Positive for environmental allergies.   Neurological: Positive for dizziness and headache. Negative for weakness and numbness.       Vitals:    03/17/23 1054   BP: 128/84   BP Location: Left arm   Patient Position: Sitting   Cuff Size: Adult   Pulse: 118   Temp: 98 °F (36.7 °C)   SpO2: 98%   Weight: 82.1 kg (181 lb)   Height: 154.9 cm (61\")   PainSc:   3     Body mass index is 34.2 kg/m².    Physical Exam     Physical Exam  Constitutional:       General: She is not in " acute distress.     Appearance: Normal appearance. She is well-developed.   HENT:      Head: Normocephalic and atraumatic.      Right Ear: External ear normal.      Left Ear: External ear normal.      Mouth/Throat:      Comments: Trace postnasal drip present.  Eyes:      Extraocular Movements: Extraocular movements intact.      Conjunctiva/sclera: Conjunctivae normal.      Pupils: Pupils are equal, round, and reactive to light.   Cardiovascular:      Rate and Rhythm: Normal rate and regular rhythm.      Heart sounds: No murmur heard.  Pulmonary:      Effort: Pulmonary effort is normal. No respiratory distress.      Breath sounds: Normal breath sounds. No wheezing.   Abdominal:      General: Bowel sounds are normal. There is no distension.      Palpations: Abdomen is soft.      Tenderness: There is no abdominal tenderness.   Musculoskeletal:      Cervical back: Neck supple.      Right lower leg: No edema.      Left lower leg: No edema.   Lymphadenopathy:      Cervical: No cervical adenopathy.   Skin:     General: Skin is warm and dry.   Neurological:      Mental Status: She is alert and oriented to person, place, and time.      Cranial Nerves: No cranial nerve deficit.   Psychiatric:         Mood and Affect: Mood normal.         Behavior: Behavior normal.         Assessment/Plan    Diagnoses and all orders for this visit:    1. Migraine with aura and without status migrainosus, not intractable (Primary)  Assessment & Plan:  We will start the patient on propranolol long acting. May continue over the counter analgesics for quick relief.         2. Vitamin D deficiency  -     Vitamin D,25-Hydroxy    3. Anxiety  Assessment & Plan:  We will treat with propranolol.      4. Primary hypertension  Assessment & Plan:  The patient reports doing better on the beta blocker than hydrochlorothiazide. We will discontinue hydrochlorothiazide and start propranolol as hypertension seems to be primarily anxiety induced.    Orders:  -      CBC & Differential  -     Comprehensive Metabolic Panel    5. Fatigue, unspecified type  -     Vitamin D,25-Hydroxy  -     Vitamin B12  -     Folate  -     TSH  -     T4, Free    6. Hyperlipidemia, unspecified hyperlipidemia type  -     Lipid Panel    Other orders  -     azelastine (ASTELIN) 0.1 % nasal spray; 2 sprays into the nostril(s) as directed by provider 2 (Two) Times a Day. Use in each nostril as directed  Dispense: 30 mL; Refill: 5  -     propranolol LA (Inderal LA) 60 MG 24 hr capsule; Take 1 capsule by mouth Daily.  Dispense: 90 capsule; Refill: 3  -     Manual Differential      New Medications Ordered This Visit   Medications   • azelastine (ASTELIN) 0.1 % nasal spray     Si sprays into the nostril(s) as directed by provider 2 (Two) Times a Day. Use in each nostril as directed     Dispense:  30 mL     Refill:  5   • propranolol LA (Inderal LA) 60 MG 24 hr capsule     Sig: Take 1 capsule by mouth Daily.     Dispense:  90 capsule     Refill:  3       No orders of the defined types were placed in this encounter.      Return in about 1 month (around 2023) for HTN, migraines.      Ashley Harrington DO       Transcribed from ambient dictation for Ashley Harrington DO by Nida Seals.  23   15:33 EDT    Patient or patient representative verbalized consent to the visit recording.  I have personally performed the services described in this document as transcribed by the above individual, and it is both accurate and complete.  Ashley Harrington DO  2023  23:52 EDT

## 2023-03-18 LAB
25(OH)D3+25(OH)D2 SERPL-MCNC: 26.6 NG/ML (ref 30–100)
ALBUMIN SERPL-MCNC: 5 G/DL (ref 3.5–5.2)
ALBUMIN/GLOB SERPL: 1.7 G/DL
ALP SERPL-CCNC: 123 U/L (ref 39–117)
ALT SERPL-CCNC: 40 U/L (ref 1–33)
AST SERPL-CCNC: 27 U/L (ref 1–32)
BASOPHILS # BLD AUTO: ABNORMAL 10*3/UL
BILIRUB SERPL-MCNC: 0.4 MG/DL (ref 0–1.2)
BUN SERPL-MCNC: 9 MG/DL (ref 6–20)
BUN/CREAT SERPL: 12.9 (ref 7–25)
CALCIUM SERPL-MCNC: 10.6 MG/DL (ref 8.6–10.5)
CHLORIDE SERPL-SCNC: 100 MMOL/L (ref 98–107)
CHOLEST SERPL-MCNC: 232 MG/DL (ref 0–200)
CO2 SERPL-SCNC: 26.4 MMOL/L (ref 22–29)
CREAT SERPL-MCNC: 0.7 MG/DL (ref 0.57–1)
DIFFERENTIAL COMMENT: NORMAL
EGFRCR SERPLBLD CKD-EPI 2021: 116.6 ML/MIN/1.73
EOSINOPHIL # BLD AUTO: ABNORMAL 10*3/UL
EOSINOPHIL # BLD MANUAL: 0.25 10*3/MM3 (ref 0–0.4)
EOSINOPHIL NFR BLD AUTO: ABNORMAL %
EOSINOPHIL NFR BLD MANUAL: 3.1 % (ref 0.3–6.2)
ERYTHROCYTE [DISTWIDTH] IN BLOOD BY AUTOMATED COUNT: 12 % (ref 12.3–15.4)
FOLATE SERPL-MCNC: 19.9 NG/ML (ref 4.78–24.2)
GLOBULIN SER CALC-MCNC: 2.9 GM/DL
GLUCOSE SERPL-MCNC: 92 MG/DL (ref 65–99)
HCT VFR BLD AUTO: 46.5 % (ref 34–46.6)
HDLC SERPL-MCNC: 64 MG/DL (ref 40–60)
HGB BLD-MCNC: 15.5 G/DL (ref 12–15.9)
LDLC SERPL CALC-MCNC: 150 MG/DL (ref 0–100)
LYMPHOCYTES # BLD AUTO: ABNORMAL 10*3/UL
LYMPHOCYTES # BLD MANUAL: 2.35 10*3/MM3 (ref 0.7–3.1)
LYMPHOCYTES NFR BLD AUTO: ABNORMAL %
LYMPHOCYTES NFR BLD MANUAL: 29.2 % (ref 19.6–45.3)
MCH RBC QN AUTO: 30.5 PG (ref 26.6–33)
MCHC RBC AUTO-ENTMCNC: 33.3 G/DL (ref 31.5–35.7)
MCV RBC AUTO: 91.4 FL (ref 79–97)
MONOCYTES # BLD MANUAL: 0.42 10*3/MM3 (ref 0.1–0.9)
MONOCYTES NFR BLD AUTO: ABNORMAL %
MONOCYTES NFR BLD MANUAL: 5.2 % (ref 5–12)
NEUTROPHILS # BLD MANUAL: 5.04 10*3/MM3 (ref 1.7–7)
NEUTROPHILS NFR BLD AUTO: ABNORMAL %
NEUTROPHILS NFR BLD MANUAL: 62.5 % (ref 42.7–76)
PLATELET # BLD AUTO: 290 10*3/MM3 (ref 140–450)
PLATELET BLD QL SMEAR: NORMAL
POTASSIUM SERPL-SCNC: 3.8 MMOL/L (ref 3.5–5.2)
PROT SERPL-MCNC: 7.9 G/DL (ref 6–8.5)
RBC # BLD AUTO: 5.09 10*6/MM3 (ref 3.77–5.28)
RBC MORPH BLD: NORMAL
SODIUM SERPL-SCNC: 141 MMOL/L (ref 136–145)
T4 FREE SERPL-MCNC: 1.39 NG/DL (ref 0.93–1.7)
TRIGL SERPL-MCNC: 103 MG/DL (ref 0–150)
TSH SERPL DL<=0.005 MIU/L-ACNC: 2.4 UIU/ML (ref 0.27–4.2)
VIT B12 SERPL-MCNC: 405 PG/ML (ref 211–946)
VLDLC SERPL CALC-MCNC: 18 MG/DL (ref 5–40)
WBC # BLD AUTO: 8.06 10*3/MM3 (ref 3.4–10.8)

## 2023-03-29 DIAGNOSIS — E83.52 HYPERCALCEMIA: ICD-10-CM

## 2023-03-29 DIAGNOSIS — R79.89 ELEVATED LFTS: Primary | ICD-10-CM

## 2023-04-13 LAB
ALBUMIN SERPL-MCNC: 4.7 G/DL (ref 3.5–5.2)
ALBUMIN/GLOB SERPL: 1.7 G/DL
ALP SERPL-CCNC: 109 U/L (ref 39–117)
ALT SERPL-CCNC: 45 U/L (ref 1–33)
AST SERPL-CCNC: 35 U/L (ref 1–32)
BILIRUB SERPL-MCNC: 0.3 MG/DL (ref 0–1.2)
BUN SERPL-MCNC: 10 MG/DL (ref 6–20)
BUN/CREAT SERPL: 14.3 (ref 7–25)
CALCIUM SERPL-MCNC: 10.3 MG/DL (ref 8.6–10.5)
CHLORIDE SERPL-SCNC: 105 MMOL/L (ref 98–107)
CO2 SERPL-SCNC: 24 MMOL/L (ref 22–29)
CREAT SERPL-MCNC: 0.7 MG/DL (ref 0.57–1)
EGFRCR SERPLBLD CKD-EPI 2021: 116.6 ML/MIN/1.73
GLOBULIN SER CALC-MCNC: 2.8 GM/DL
GLUCOSE SERPL-MCNC: 92 MG/DL (ref 65–99)
INTACT PTH: NORMAL
POTASSIUM SERPL-SCNC: 4.4 MMOL/L (ref 3.5–5.2)
PROT SERPL-MCNC: 7.5 G/DL (ref 6–8.5)
PTH-INTACT SERPL-MCNC: 24 PG/ML (ref 15–65)
SODIUM SERPL-SCNC: 142 MMOL/L (ref 136–145)

## 2023-04-17 ENCOUNTER — OFFICE VISIT (OUTPATIENT)
Dept: INTERNAL MEDICINE | Facility: CLINIC | Age: 33
End: 2023-04-17
Payer: COMMERCIAL

## 2023-04-17 VITALS
TEMPERATURE: 97 F | SYSTOLIC BLOOD PRESSURE: 120 MMHG | HEART RATE: 81 BPM | BODY MASS INDEX: 33.79 KG/M2 | OXYGEN SATURATION: 97 % | HEIGHT: 61 IN | DIASTOLIC BLOOD PRESSURE: 80 MMHG | WEIGHT: 179 LBS

## 2023-04-17 DIAGNOSIS — F41.9 ANXIETY: ICD-10-CM

## 2023-04-17 DIAGNOSIS — I10 PRIMARY HYPERTENSION: Primary | ICD-10-CM

## 2023-04-17 DIAGNOSIS — G43.109 MIGRAINE WITH AURA AND WITHOUT STATUS MIGRAINOSUS, NOT INTRACTABLE: ICD-10-CM

## 2023-04-17 PROCEDURE — 99214 OFFICE O/P EST MOD 30 MIN: CPT | Performed by: FAMILY MEDICINE

## 2023-04-17 RX ORDER — PROPRANOLOL HYDROCHLORIDE 20 MG/1
20 TABLET ORAL 2 TIMES DAILY
Qty: 180 TABLET | Refills: 1 | Status: SHIPPED | OUTPATIENT
Start: 2023-04-17

## 2023-04-17 NOTE — ASSESSMENT & PLAN NOTE
Controlled in office today. However, patient has had hypotensive episodes. We will decrease propranolol to 20 mg twice a day.

## 2023-04-17 NOTE — ASSESSMENT & PLAN NOTE
Improved with current medication. However, she is having hypotensive episodes with propranolol, we will decrease to 20 mg twice a day.

## 2023-04-17 NOTE — ASSESSMENT & PLAN NOTE
Improved with current medication. Continue propranolol at a lower dosage. She does have some life circumstances that have caused some increased anxiety here lately such as job changes.

## 2023-04-17 NOTE — PROGRESS NOTES
"Mis Wyatt is a 32 y.o. female.    Chief Complaint   Patient presents with   • Hypertension   • Migraine       HPI     Mis Wyatt is a 32-year-old female who presents today for follow-up of hypertension and migraine.    Patient has hypertension.  She has been compliant with medications without side effects.  Blood pressure is controlled in the office today. She has been checking her blood pressure at home and it has been running low. On Friday, 04/14/23 it was 106/65mmHg and Saturday, 04/15/23, it was 119/72mmHg.  The first couple of weeks her heart rate would be in the 50s and 60s. The patient is following a low salt diet and is active.    The patient states she is \"nervous and worried consistently\". It is worse when she is ovulating and on her period. She states when \"life calms down\" she thinks it will improve. The patient has been by herself at her job for a week now and has been more anxious. The patient does expereince angina, palpitations and fluttering of her heart sometimes, but it has improved. She experiences shortness of breath sometimes.    The patient is expereincing migraines every couple of weeks secondary to her menstrual cycle. certain foods will trigger it as well.      The following portions of the patient's history were reviewed and updated as appropriate: allergies, current medications, past family history, past medical history, past social history, past surgical history and problem list.     Allergies   Allergen Reactions   • Montelukast Hives         Current Outpatient Medications:   •  azelastine (ASTELIN) 0.1 % nasal spray, 2 sprays into the nostril(s) as directed by provider 2 (Two) Times a Day. Use in each nostril as directed, Disp: 30 mL, Rfl: 5  •  levocetirizine (XYZAL) 5 MG tablet, , Disp: , Rfl:   •  olopatadine (PATANOL) 0.1 % ophthalmic solution, INSTILL 1 DROP INTO THE AFFECTED EYE(S) TWO TIMES A DAY AS DIRECTED, Disp: , Rfl:   •  propranolol (INDERAL) 20 MG tablet, Take 1 " "tablet by mouth 2 (Two) Times a Day., Disp: 180 tablet, Rfl: 1  •  Sharobel 0.35 MG tablet, Take 1 tablet by mouth Daily. (Patient not taking: Reported on 4/17/2023), Disp: , Rfl:     ROS    Review of Systems   Constitutional: Negative for chills and fever.   Respiratory: Negative for cough and shortness of breath.    Gastrointestinal: Positive for diarrhea.   Neurological: Positive for headache.   Psychiatric/Behavioral: The patient is nervous/anxious.        Vitals:    04/17/23 1550   BP: 120/80   BP Location: Right arm   Patient Position: Sitting   Cuff Size: Adult   Pulse: 81   Temp: 97 °F (36.1 °C)   SpO2: 97%   Weight: 81.2 kg (179 lb)   Height: 154.9 cm (61\")   PainSc: 0-No pain     Body mass index is 33.82 kg/m².    Physical Exam     Physical Exam  Constitutional:       General: She is not in acute distress.     Appearance: She is well-developed.   HENT:      Head: Normocephalic and atraumatic.      Right Ear: External ear normal.      Left Ear: External ear normal.   Eyes:      Extraocular Movements: Extraocular movements intact.      Conjunctiva/sclera: Conjunctivae normal.   Cardiovascular:      Rate and Rhythm: Normal rate and regular rhythm.      Heart sounds: No murmur heard.  Pulmonary:      Effort: Pulmonary effort is normal. No respiratory distress.      Breath sounds: Normal breath sounds. No wheezing.   Abdominal:      General: Bowel sounds are normal. There is no distension.      Palpations: Abdomen is soft.      Tenderness: There is abdominal tenderness.      Comments: Lower abdominal tenderness.    Skin:     General: Skin is warm and dry.   Neurological:      Mental Status: She is alert and oriented to person, place, and time.      Cranial Nerves: No cranial nerve deficit.   Psychiatric:         Mood and Affect: Mood normal.         Behavior: Behavior normal.         Assessment/Plan    Diagnoses and all orders for this visit:    1. Primary hypertension (Primary)  Assessment & Plan:  Controlled " in office today. However, patient has had hypotensive episodes. We will decrease propranolol to 20 mg twice a day.      2. Anxiety  Assessment & Plan:  Improved with current medication. Continue propranolol at a lower dosage. She does have some life circumstances that have caused some increased anxiety here lately such as job changes.      3. Migraine with aura and without status migrainosus, not intractable  Assessment & Plan:  Improved with current medication. However, she is having hypotensive episodes with propranolol, we will decrease to 20 mg twice a day.         Other orders  -     propranolol (INDERAL) 20 MG tablet; Take 1 tablet by mouth 2 (Two) Times a Day.  Dispense: 180 tablet; Refill: 1      New Medications Ordered This Visit   Medications   • propranolol (INDERAL) 20 MG tablet     Sig: Take 1 tablet by mouth 2 (Two) Times a Day.     Dispense:  180 tablet     Refill:  1       No orders of the defined types were placed in this encounter.      Return in about 2 months (around 6/17/2023) for Annual.        Transcribed from ambient dictation for Ashley Harrington DO by Yovani Espinoza.  04/17/23   17:58 EDT    Patient or patient representative verbalized consent to the visit recording.  I have personally performed the services described in this document as transcribed by the above individual, and it is both accurate and complete.  Ashley Harrington DO

## 2023-06-28 PROBLEM — R19.7 DIARRHEA: Status: ACTIVE | Noted: 2023-06-28

## 2023-06-28 PROBLEM — K21.9 GASTROESOPHAGEAL REFLUX DISEASE: Status: ACTIVE | Noted: 2023-06-28

## 2023-08-14 RX ORDER — LEVOCETIRIZINE DIHYDROCHLORIDE 5 MG/1
TABLET, FILM COATED ORAL
Qty: 90 TABLET | Refills: 0 | OUTPATIENT
Start: 2023-08-14

## 2023-08-16 RX ORDER — LEVOCETIRIZINE DIHYDROCHLORIDE 5 MG/1
5 TABLET, FILM COATED ORAL EVERY EVENING
Qty: 30 TABLET | Refills: 0 | Status: SHIPPED | OUTPATIENT
Start: 2023-08-16

## 2023-08-29 ENCOUNTER — OFFICE VISIT (OUTPATIENT)
Dept: INTERNAL MEDICINE | Facility: CLINIC | Age: 33
End: 2023-08-29
Payer: COMMERCIAL

## 2023-08-29 VITALS
WEIGHT: 177 LBS | HEIGHT: 61 IN | HEART RATE: 74 BPM | TEMPERATURE: 97 F | DIASTOLIC BLOOD PRESSURE: 80 MMHG | OXYGEN SATURATION: 97 % | SYSTOLIC BLOOD PRESSURE: 120 MMHG | BODY MASS INDEX: 33.42 KG/M2

## 2023-08-29 DIAGNOSIS — R42 VERTIGO: ICD-10-CM

## 2023-08-29 DIAGNOSIS — Z00.00 WELL ADULT EXAM: Primary | ICD-10-CM

## 2023-08-29 DIAGNOSIS — M25.552 LEFT HIP PAIN: ICD-10-CM

## 2023-08-29 RX ORDER — LEVOCETIRIZINE DIHYDROCHLORIDE 5 MG/1
5 TABLET, FILM COATED ORAL EVERY EVENING
Qty: 90 TABLET | Refills: 3 | Status: SHIPPED | OUTPATIENT
Start: 2023-08-29

## 2023-08-29 RX ORDER — MECLIZINE HYDROCHLORIDE 25 MG/1
25 TABLET ORAL 3 TIMES DAILY PRN
Qty: 30 TABLET | Refills: 0 | Status: SHIPPED | OUTPATIENT
Start: 2023-08-29

## 2023-08-29 RX ORDER — MELATONIN
2000 DAILY
COMMUNITY

## 2023-08-29 NOTE — ASSESSMENT & PLAN NOTE
Discussed with the patient routine health maintenance including: Vaccines, dental/eye health, healthy diet and exercise. She does see gynecology regularly for routine Pap smear. She is not due until 2025 for Pap smear. She is not due for any routine labs at this time. Tdap has been administered today.

## 2023-08-29 NOTE — PROGRESS NOTES
"Mis Wyatt is a 33 y.o. female.    Chief Complaint   Patient presents with    Annual Exam       HPI     Mis Wyatt is a 33-year-old female who presents today for an annual physical exam.    Patient denies any significant changes or complaints. She eats a healthy diet and exercises regularly. She is up to date on dental and eye exams. She is up to date with her Pap smear and COVID-19 vaccine. She wishes to obtain the tetanus vaccine in the office today. She declines to obtain the influenza vaccine at this time. Patient confirms taking propranolol twice daily. She reports feeling tired in the office today due to having less sleep last night.    Patient states she feels more bloated. She complains of discomfort that \"feels like a gas bubble\" towards her left chest. She denies any reflux or heartburn. She recently tested positive for H. pylori infection with symptoms of diarrhea. Patient was treated with Prevpac with little relief and decreased episodes of diarrhea.    Patient has been complaining of left hip pain for a few months. Pain radiates across her lower back. Pain is worse with standing up and sitting down. Symptoms are better. Patient has tried walking and running for exercise. She has not obtained any x-rays for this issue. She denies any pain towards her groin when crossing her legs.    Patient complains of intermittent episodes of vertigo. She states that symptoms randomly occur when she is laying down. She describes one episode lasted 30 minutes and caused her to vomit.      The following portions of the patient's history were reviewed and updated as appropriate: allergies, current medications, past family history, past medical history, past social history, past surgical history and problem list.     Past Medical History:   Diagnosis Date    Allergic     Anxiety     Cholelithiasis July 2022    Headache     Hyperlipidemia     Hypertension        Past Surgical History:   Procedure Laterality Date    " ADENOIDECTOMY      LAPAROSCOPIC CHOLECYSTECTOMY  08/16/2022    OTHER SURGICAL HISTORY  01/13/2017    Suction D&C    TONSILLECTOMY         Family History   Problem Relation Age of Onset    Depression Mother     Atrial fibrillation Mother     Diabetes Father     Hypertension Father     Early death Father         unknown    Hearing loss Father     Clotting disorder Father     Asthma Sister     Heart failure Maternal Grandmother        Social History     Socioeconomic History    Marital status:    Tobacco Use    Smoking status: Never     Passive exposure: Past    Smokeless tobacco: Never   Vaping Use    Vaping Use: Never used   Substance and Sexual Activity    Alcohol use: No    Drug use: No    Sexual activity: Yes     Partners: Male     Birth control/protection: Birth control pill       Allergies   Allergen Reactions    Montelukast Mental Status Change         Current Outpatient Medications:     azelastine (ASTELIN) 0.1 % nasal spray, 2 sprays into the nostril(s) as directed by provider 2 (Two) Times a Day. Use in each nostril as directed, Disp: 30 mL, Rfl: 5    Drospirenone (Slynd) 4 MG tablet, Take  by mouth., Disp: , Rfl:     levocetirizine (XYZAL) 5 MG tablet, Take 1 tablet by mouth Every Evening., Disp: 90 tablet, Rfl: 3    olopatadine (PATANOL) 0.1 % ophthalmic solution, INSTILL 1 DROP INTO THE AFFECTED EYE(S) TWO TIMES A DAY AS DIRECTED, Disp: , Rfl:     propranolol (INDERAL) 20 MG tablet, Take 1 tablet by mouth 2 (Two) Times a Day., Disp: 180 tablet, Rfl: 1    Cholecalciferol 25 MCG (1000 UT) tablet, Take 2 tablets by mouth Daily., Disp: , Rfl:     meclizine (ANTIVERT) 25 MG tablet, Take 1 tablet by mouth 3 (Three) Times a Day As Needed for Dizziness., Disp: 30 tablet, Rfl: 0    ROS    Review of Systems   Constitutional:  Negative for chills, fatigue and fever.   HENT:  Negative for congestion, postnasal drip and sore throat.    Respiratory:  Negative for cough, shortness of breath and wheezing.   "  Cardiovascular:  Negative for chest pain and leg swelling.   Gastrointestinal:  Positive for diarrhea and nausea. Negative for abdominal pain, constipation and vomiting.   Genitourinary:  Negative for dysuria and frequency.   Musculoskeletal:  Positive for arthralgias. Negative for back pain.   Neurological:  Positive for dizziness. Negative for weakness, numbness and headache.   Psychiatric/Behavioral:  Negative for depressed mood. The patient is not nervous/anxious.      Vitals:    08/29/23 1440   BP: 120/80   BP Location: Right arm   Patient Position: Sitting   Cuff Size: Adult   Pulse: 74   Temp: 97 øF (36.1 øC)   SpO2: 97%   Weight: 80.3 kg (177 lb)   Height: 154.9 cm (61\")     Body mass index is 33.44 kg/mý.    Physical Exam     Physical Exam  Constitutional:       General: She is not in acute distress.     Appearance: Normal appearance. She is well-developed.   HENT:      Head: Normocephalic and atraumatic.      Right Ear: Tympanic membrane and external ear normal.      Left Ear: Tympanic membrane and external ear normal.      Mouth/Throat:      Comments: Trace postnasal drip noted to her oropharynx.   Eyes:      Extraocular Movements: Extraocular movements intact.      Conjunctiva/sclera: Conjunctivae normal.      Pupils: Pupils are equal, round, and reactive to light.   Cardiovascular:      Rate and Rhythm: Normal rate and regular rhythm.      Heart sounds: No murmur heard.  Pulmonary:      Effort: Pulmonary effort is normal. No respiratory distress.      Breath sounds: Normal breath sounds. No wheezing.   Abdominal:      General: Bowel sounds are normal. There is no distension.      Palpations: Abdomen is soft.      Tenderness: There is no abdominal tenderness.   Musculoskeletal:      Cervical back: Neck supple.      Right lower leg: No edema.      Left lower leg: No edema.   Lymphadenopathy:      Cervical: No cervical adenopathy.   Skin:     General: Skin is warm and dry.   Neurological:      Mental " Status: She is alert and oriented to person, place, and time.      Cranial Nerves: No cranial nerve deficit.      Deep Tendon Reflexes: Reflexes normal.   Psychiatric:         Mood and Affect: Mood normal.         Behavior: Behavior normal.       Assessment/Plan    Diagnoses and all orders for this visit:    1. Well adult exam (Primary)  Assessment & Plan:  Discussed with the patient routine health maintenance including: Vaccines, dental/eye health, healthy diet and exercise. She does see gynecology regularly for routine Pap smear. She is not due until 2025 for Pap smear. She is not due for any routine labs at this time. Tdap has been administered today.      2. Left hip pain  Assessment & Plan:  Likely more secondary to low back. Encouraged stretches and exercises at home. If no improvement, we will obtain an x-ray of the low back and left hip.      3. Vertigo  Assessment & Plan:  May have been secondary to middle ear effusion or viral illness. She has been given meclizine to take as needed for bouts of vertigo that seem to last longer.      Other orders  -     Tdap Vaccine Greater Than or Equal To 8yo IM  -     meclizine (ANTIVERT) 25 MG tablet; Take 1 tablet by mouth 3 (Three) Times a Day As Needed for Dizziness.  Dispense: 30 tablet; Refill: 0  -     levocetirizine (XYZAL) 5 MG tablet; Take 1 tablet by mouth Every Evening.  Dispense: 90 tablet; Refill: 3        New Medications Ordered This Visit   Medications    meclizine (ANTIVERT) 25 MG tablet     Sig: Take 1 tablet by mouth 3 (Three) Times a Day As Needed for Dizziness.     Dispense:  30 tablet     Refill:  0    levocetirizine (XYZAL) 5 MG tablet     Sig: Take 1 tablet by mouth Every Evening.     Dispense:  90 tablet     Refill:  3       Orders Placed This Encounter   Procedures    Tdap Vaccine Greater Than or Equal To 8yo IM       Return in about 1 year (around 8/29/2024) for Annual.      Ashley Harrington DO      Transcribed from ambient dictation for  Ashley Harrington DO by Nida Seals.  08/29/23   17:22 EDT    Patient or patient representative verbalized consent to the visit recording.  I have personally performed the services described in this document as transcribed by the above individual, and it is both accurate and complete.  Ashley Harrington DO  8/31/2023  01:00 EDT

## 2023-08-29 NOTE — ASSESSMENT & PLAN NOTE
May have been secondary to middle ear effusion or viral illness. She has been given meclizine to take as needed for bouts of vertigo that seem to last longer.

## 2023-08-29 NOTE — ASSESSMENT & PLAN NOTE
Likely more secondary to low back. Encouraged stretches and exercises at home. If no improvement, we will obtain an x-ray of the low back and left hip.

## 2023-10-16 RX ORDER — PROPRANOLOL HYDROCHLORIDE 20 MG/1
20 TABLET ORAL 2 TIMES DAILY
Qty: 180 TABLET | Refills: 1 | Status: SHIPPED | OUTPATIENT
Start: 2023-10-16

## 2023-11-30 ENCOUNTER — OFFICE VISIT (OUTPATIENT)
Dept: INTERNAL MEDICINE | Facility: CLINIC | Age: 33
End: 2023-11-30
Payer: COMMERCIAL

## 2023-11-30 VITALS
WEIGHT: 176 LBS | OXYGEN SATURATION: 100 % | DIASTOLIC BLOOD PRESSURE: 80 MMHG | HEART RATE: 88 BPM | SYSTOLIC BLOOD PRESSURE: 120 MMHG | HEIGHT: 61 IN | BODY MASS INDEX: 33.23 KG/M2 | TEMPERATURE: 97 F

## 2023-11-30 DIAGNOSIS — J01.40 ACUTE NON-RECURRENT PANSINUSITIS: Primary | ICD-10-CM

## 2023-11-30 DIAGNOSIS — R05.1 ACUTE COUGH: ICD-10-CM

## 2023-11-30 DIAGNOSIS — R09.81 NASAL SINUS CONGESTION: ICD-10-CM

## 2023-11-30 LAB
EXPIRATION DATE: NORMAL
FLUAV AG UPPER RESP QL IA.RAPID: NOT DETECTED
FLUBV AG UPPER RESP QL IA.RAPID: NOT DETECTED
INTERNAL CONTROL: NORMAL
Lab: NORMAL
SARS-COV-2 AG UPPER RESP QL IA.RAPID: NOT DETECTED

## 2023-11-30 PROCEDURE — 99213 OFFICE O/P EST LOW 20 MIN: CPT | Performed by: FAMILY MEDICINE

## 2023-11-30 PROCEDURE — 87428 SARSCOV & INF VIR A&B AG IA: CPT | Performed by: FAMILY MEDICINE

## 2023-11-30 RX ORDER — METHYLPREDNISOLONE 4 MG/1
TABLET ORAL
Qty: 21 EACH | Refills: 0 | Status: SHIPPED | OUTPATIENT
Start: 2023-11-30

## 2023-11-30 RX ORDER — AMOXICILLIN AND CLAVULANATE POTASSIUM 875; 125 MG/1; MG/1
1 TABLET, FILM COATED ORAL 2 TIMES DAILY
Qty: 20 TABLET | Refills: 0 | Status: SHIPPED | OUTPATIENT
Start: 2023-11-30

## 2023-11-30 NOTE — PROGRESS NOTES
Mis Wyatt is a 33 y.o. female.    Chief Complaint   Patient presents with    Headache     Has a lot of pressure in head when moving.     Nasal Congestion    Cough       HPI   Mis Wyatt is a 33-year-old female who presents today complaining of upper respiratory symptoms.    Patient reports that she has not been feeling well for some time. She admits to sinus pressure, facial pain, rhinorrhea, postnasal drip, and some bilateral otalgia. Her sinus pressure began last week. She has a cough but notes that this has improved. She experienced bilateral eye redness on 11/28/2023. She denies fever or nausea. She was previously seen at urgent care and was noted to have drainage and erythema in her ear. She was prescribed cefdinir but states that it did not work. She adds that she took the cefdinir for approximately 7 days. She was also prescribed steroid medication for 5 days but notes that it did not alleviate her sinus pressure. She confirms that she currently has a nasal spray at home.    The following portions of the patient's history were reviewed and updated as appropriate: allergies, current medications, past family history, past medical history, past social history, past surgical history and problem list.     Allergies   Allergen Reactions    Montelukast Mental Status Change         Current Outpatient Medications:     azelastine (ASTELIN) 0.1 % nasal spray, 2 sprays into the nostril(s) as directed by provider 2 (Two) Times a Day. Use in each nostril as directed, Disp: 30 mL, Rfl: 5    Cholecalciferol 25 MCG (1000 UT) tablet, Take 2 tablets by mouth Daily., Disp: , Rfl:     Drospirenone (Slynd) 4 MG tablet, Take  by mouth., Disp: , Rfl:     levocetirizine (XYZAL) 5 MG tablet, Take 1 tablet by mouth Every Evening., Disp: 90 tablet, Rfl: 3    meclizine (ANTIVERT) 25 MG tablet, Take 1 tablet by mouth 3 (Three) Times a Day As Needed for Dizziness., Disp: 30 tablet, Rfl: 0    olopatadine (PATANOL) 0.1 % ophthalmic  "solution, INSTILL 1 DROP INTO THE AFFECTED EYE(S) TWO TIMES A DAY AS DIRECTED, Disp: , Rfl:     propranolol (INDERAL) 20 MG tablet, Take 1 tablet by mouth 2 (Two) Times a Day., Disp: 180 tablet, Rfl: 1    amoxicillin-clavulanate (AUGMENTIN) 875-125 MG per tablet, Take 1 tablet by mouth 2 (Two) Times a Day., Disp: 20 tablet, Rfl: 0    methylPREDNISolone (MEDROL) 4 MG dose pack, Take as directed on package instructions., Disp: 21 each, Rfl: 0    ROS    Review of Systems   Constitutional:  Negative for fever.   HENT:  Positive for congestion, postnasal drip and sinus pressure.    Gastrointestinal:  Negative for nausea.   Neurological:  Positive for headache.       Vitals:    11/30/23 1346   BP: 120/80   BP Location: Right arm   Patient Position: Sitting   Cuff Size: Adult   Pulse: 88   Temp: 97 °F (36.1 °C)   SpO2: 100%   Weight: 79.8 kg (176 lb)   Height: 154.9 cm (61\")         Physical Exam     Physical Exam  Constitutional:       Appearance: Normal appearance.   HENT:      Head: Normocephalic and atraumatic.      Right Ear: Tympanic membrane normal.      Left Ear: Tympanic membrane normal.      Nose:      Right Sinus: Maxillary sinus tenderness and frontal sinus tenderness present.      Left Sinus: Maxillary sinus tenderness and frontal sinus tenderness present.   Eyes:      Extraocular Movements: Extraocular movements intact.      Conjunctiva/sclera: Conjunctivae normal.      Pupils: Pupils are equal, round, and reactive to light.   Cardiovascular:      Rate and Rhythm: Normal rate and regular rhythm.      Pulses: Normal pulses.      Heart sounds: Normal heart sounds.   Pulmonary:      Effort: Pulmonary effort is normal.      Breath sounds: Normal breath sounds.   Abdominal:      General: There is no distension.   Musculoskeletal:      Right lower leg: No edema.      Left lower leg: No edema.   Skin:     General: Skin is warm and dry.   Neurological:      Mental Status: She is alert and oriented to person, place, " and time.   Psychiatric:         Mood and Affect: Mood normal.         Behavior: Behavior normal.         Thought Content: Thought content normal.         Assessment/Plan    Diagnoses and all orders for this visit:    1. Acute non-recurrent pansinusitis (Primary)  Assessment & Plan:  We will treat with Augmentin for 10 days. We will also prescribe another round of steroids.      2. Nasal sinus congestion  -     POCT SARS-CoV-2 Antigen MAEVE + Flu    3. Acute cough  -     POCT SARS-CoV-2 Antigen MAEVE + Flu    Other orders  -     amoxicillin-clavulanate (AUGMENTIN) 875-125 MG per tablet; Take 1 tablet by mouth 2 (Two) Times a Day.  Dispense: 20 tablet; Refill: 0  -     methylPREDNISolone (MEDROL) 4 MG dose pack; Take as directed on package instructions.  Dispense: 21 each; Refill: 0        New Medications Ordered This Visit   Medications    amoxicillin-clavulanate (AUGMENTIN) 875-125 MG per tablet     Sig: Take 1 tablet by mouth 2 (Two) Times a Day.     Dispense:  20 tablet     Refill:  0    methylPREDNISolone (MEDROL) 4 MG dose pack     Sig: Take as directed on package instructions.     Dispense:  21 each     Refill:  0       No orders of the defined types were placed in this encounter.      Return if symptoms worsen or fail to improve.    Transcribed from ambient dictation for Ashley Harrington DO by Daniela Lomeli.  11/30/23   14:32 EST    Patient or patient representative verbalized consent to the visit recording.  I have personally performed the services described in this document as transcribed by the above individual, and it is both accurate and complete.  Ashley Harrington DO  11/30/2023  17:37 EST    Ashley Harrington DO

## 2024-04-15 RX ORDER — PROPRANOLOL HYDROCHLORIDE 20 MG/1
20 TABLET ORAL 2 TIMES DAILY
Qty: 180 TABLET | Refills: 2 | Status: SHIPPED | OUTPATIENT
Start: 2024-04-15

## 2024-04-29 RX ORDER — AZELASTINE 1 MG/ML
SPRAY, METERED NASAL
Qty: 30 ML | Refills: 0 | Status: SHIPPED | OUTPATIENT
Start: 2024-04-29

## 2025-01-20 RX ORDER — PROPRANOLOL HCL 20 MG
20 TABLET ORAL 2 TIMES DAILY
Qty: 60 TABLET | Refills: 0 | Status: SHIPPED | OUTPATIENT
Start: 2025-01-20

## 2025-01-20 NOTE — TELEPHONE ENCOUNTER
Rx Refill Note  Requested Prescriptions     Pending Prescriptions Disp Refills    propranolol (INDERAL) 20 MG tablet [Pharmacy Med Name: Propranolol HCl Oral Tablet 20 MG] 180 tablet 0     Sig: TAKE 1 TABLET BY MOUTH 2 TIMES A DAY      Last office visit with prescribing clinician: 11/30/2023   Last telemedicine visit with prescribing clinician: Visit date not found   Next office visit with prescribing clinician: Visit date not found                         Would you like a call back once the refill request has been completed: [] Yes [] No    If the office needs to give you a call back, can they leave a voicemail: [] Yes [] No    Herlinda Good MA  01/20/25, 16:20 EST

## 2025-02-19 RX ORDER — PROPRANOLOL HCL 20 MG
20 TABLET ORAL 2 TIMES DAILY
Qty: 180 TABLET | Refills: 0 | Status: SHIPPED | OUTPATIENT
Start: 2025-02-19

## 2025-02-19 NOTE — TELEPHONE ENCOUNTER
Advised  patient was due for an appt and that we will give enough until that appt, that she needs to be sure to keep so no lapse in refills or meds

## 2025-05-05 ENCOUNTER — OFFICE VISIT (OUTPATIENT)
Dept: INTERNAL MEDICINE | Facility: CLINIC | Age: 35
End: 2025-05-05
Payer: COMMERCIAL

## 2025-05-05 VITALS
DIASTOLIC BLOOD PRESSURE: 82 MMHG | HEART RATE: 78 BPM | BODY MASS INDEX: 33.99 KG/M2 | SYSTOLIC BLOOD PRESSURE: 122 MMHG | OXYGEN SATURATION: 100 % | HEIGHT: 61 IN | TEMPERATURE: 98 F | WEIGHT: 180 LBS

## 2025-05-05 DIAGNOSIS — E78.5 HYPERLIPIDEMIA, UNSPECIFIED HYPERLIPIDEMIA TYPE: ICD-10-CM

## 2025-05-05 DIAGNOSIS — Z00.00 WELL ADULT EXAM: Primary | ICD-10-CM

## 2025-05-05 DIAGNOSIS — Z13.29 SCREENING FOR ENDOCRINE DISORDER: ICD-10-CM

## 2025-05-05 DIAGNOSIS — Z13.0 SCREENING FOR BLOOD DISEASE: ICD-10-CM

## 2025-05-05 DIAGNOSIS — G43.109 MIGRAINE WITH AURA AND WITHOUT STATUS MIGRAINOSUS, NOT INTRACTABLE: ICD-10-CM

## 2025-05-05 DIAGNOSIS — F41.9 ANXIETY: ICD-10-CM

## 2025-05-05 DIAGNOSIS — N97.9 FEMALE FERTILITY PROBLEM: ICD-10-CM

## 2025-05-05 DIAGNOSIS — M62.89 PELVIC FLOOR DYSFUNCTION: ICD-10-CM

## 2025-05-05 PROBLEM — R19.7 DIARRHEA: Status: RESOLVED | Noted: 2023-06-28 | Resolved: 2025-05-05

## 2025-05-05 PROBLEM — J01.40 ACUTE NON-RECURRENT PANSINUSITIS: Status: RESOLVED | Noted: 2023-11-30 | Resolved: 2025-05-05

## 2025-05-05 RX ORDER — PROPRANOLOL HCL 20 MG
20 TABLET ORAL 2 TIMES DAILY
Qty: 180 TABLET | Refills: 3 | Status: SHIPPED | OUTPATIENT
Start: 2025-05-05

## 2025-05-05 RX ORDER — EPINEPHRINE 0.3 MG/.3ML
INJECTION SUBCUTANEOUS
COMMUNITY
Start: 2025-02-18

## 2025-05-05 RX ORDER — ALBUTEROL SULFATE AND BUDESONIDE 90; 80 UG/1; UG/1
2 AEROSOL, METERED RESPIRATORY (INHALATION)
COMMUNITY
Start: 2025-02-18

## 2025-05-05 NOTE — PROGRESS NOTES
Mis Wyatt is a 34 y.o. female.    Chief Complaint   Patient presents with    Annual Exam       HPI   History of Present Illness  The patient presents for an annual physical exam and follow-up on migraines.    She reports increased stress due to her mother's recent endometrial cancer diagnosis and hysterectomy on 05/01/2025, with radiation starting this week. She experiences sharp, stabbing chest pains at night, palpitations, and difficulty initiating sleep. She avoids sleep aids, including Benadryl due to excessive drowsiness.  Propranolol does help with anxiety to some extent.     She manages migraines with propranolol twice daily, which is effective but less so recently due to stress. She is on day 3 of a migraine episode. For acute attacks, she alternates between Excedrin and ibuprofen. She prefers to avoid additional medications.    She maintains a healthy diet and has resumed exercise. She is up-to-date with dental exams and plans an eye exam. She did not need a Pap smear at her last gynecological visit. She has received 3 COVID-19 vaccines and a tetanus vaccine 2 years ago. Does not routinely receive the flu vaccine.      She reports no respiratory symptoms, slight decrease in energy, no urinary issues but worsening incomplete bladder emptying since her D and C procedure.  No skin rashes or bruising, and reflux and heartburn disrupting sleep without medication.    She has been off birth control since 11/2024 and has an ultrasound scheduled for Wednesday for reproductive health evaluation. She experiences constant pelvic pain and more regular menstrual cycles, suspecting elevated estrogen levels.          The following portions of the patient's history were reviewed and updated as appropriate: allergies, current medications, past family history, past medical history, past social history, past surgical history and problem list.     Past Medical History:   Diagnosis Date    Allergic     Anxiety      Cholelithiasis 2022    Headache     Hyperlipidemia     Hypertension        Past Surgical History:   Procedure Laterality Date    ADENOIDECTOMY       SECTION  16    LAPAROSCOPIC CHOLECYSTECTOMY  2022    OTHER SURGICAL HISTORY  2017    Suction D&C    TONSILLECTOMY         Family History   Problem Relation Age of Onset    Depression Mother     Atrial fibrillation Mother     Endometrial cancer Mother     Diabetes Father     Hypertension Father     Early death Father         unknown    Hearing loss Father     Clotting disorder Father     Asthma Sister     Other Sister         PCOS    Asthma Sister     Depression Sister     Heart failure Maternal Grandmother     Kidney disease Paternal Grandfather        Social History     Socioeconomic History    Marital status:    Tobacco Use    Smoking status: Never     Passive exposure: Past    Smokeless tobacco: Never   Vaping Use    Vaping status: Never Used   Substance and Sexual Activity    Alcohol use: No    Drug use: No    Sexual activity: Yes     Partners: Male     Birth control/protection: None       Allergies   Allergen Reactions    Montelukast Mental Status Change         Current Outpatient Medications:     Airsupra 90-80 MCG/ACT aerosol, Take 2 puffs by mouth., Disp: , Rfl:     azelastine (ASTELIN) 0.1 % nasal spray, INSTILL 2 SPRAYS INTO THE NOSTRIL(S) TWO TIMES A DAY AS DIRECTED, Disp: 30 mL, Rfl: 0    Cholecalciferol 25 MCG (1000 UT) tablet, Take 2 tablets by mouth Daily., Disp: , Rfl:     EPINEPHrine (EPIPEN) 0.3 MG/0.3ML solution auto-injector injection, Inject  into the appropriate muscle as directed by prescriber., Disp: , Rfl:     levocetirizine (XYZAL) 5 MG tablet, Take 1 tablet by mouth Every Evening., Disp: 90 tablet, Rfl: 3    meclizine (ANTIVERT) 25 MG tablet, Take 1 tablet by mouth 3 (Three) Times a Day As Needed for Dizziness., Disp: 30 tablet, Rfl: 0    olopatadine (PATANOL) 0.1 % ophthalmic solution, INSTILL 1 DROP INTO  "THE AFFECTED EYE(S) TWO TIMES A DAY AS DIRECTED, Disp: , Rfl:     propranolol (INDERAL) 20 MG tablet, Take 1 tablet by mouth 2 (Two) Times a Day., Disp: 180 tablet, Rfl: 3    ROS    Review of Systems   Constitutional:  Positive for fatigue. Negative for chills and fever.   HENT:  Negative for congestion, postnasal drip and sore throat.    Eyes:  Negative for visual disturbance.   Respiratory:  Negative for cough, shortness of breath and wheezing.    Cardiovascular:  Positive for chest pain and palpitations.   Gastrointestinal:  Negative for abdominal pain, constipation, diarrhea, nausea and vomiting.   Endocrine: Negative for cold intolerance and heat intolerance.   Genitourinary:  Positive for difficulty urinating (difficulty emptying).   Musculoskeletal:  Negative for arthralgias.   Skin:  Negative for color change and rash.   Allergic/Immunologic: Negative for environmental allergies.   Neurological:  Negative for weakness, numbness and headache.   Hematological:  Does not bruise/bleed easily.   Psychiatric/Behavioral:  Positive for sleep disturbance. Negative for depressed mood. The patient is nervous/anxious.        Vitals:    05/05/25 1034   BP: 122/82   Pulse: 78   Temp: 98 °F (36.7 °C)   SpO2: 100%   Weight: 81.6 kg (180 lb)   Height: 154.9 cm (61\")   PainSc: 0-No pain     Body mass index is 34.01 kg/m².    Physical Exam     Physical Exam  Constitutional:       General: She is not in acute distress.     Appearance: Normal appearance. She is well-developed.   HENT:      Head: Normocephalic and atraumatic.      Right Ear: Tympanic membrane and external ear normal.      Left Ear: Tympanic membrane and external ear normal.      Mouth/Throat:      Pharynx: No posterior oropharyngeal erythema.   Eyes:      Extraocular Movements: Extraocular movements intact.      Conjunctiva/sclera: Conjunctivae normal.      Pupils: Pupils are equal, round, and reactive to light.   Cardiovascular:      Rate and Rhythm: Normal rate " and regular rhythm.      Heart sounds: No murmur heard.  Pulmonary:      Effort: Pulmonary effort is normal. No respiratory distress.      Breath sounds: Normal breath sounds. No wheezing.   Abdominal:      General: Bowel sounds are normal. There is no distension.      Palpations: Abdomen is soft.      Tenderness: There is no abdominal tenderness.   Musculoskeletal:      Cervical back: Neck supple.      Right lower leg: No edema.      Left lower leg: No edema.   Lymphadenopathy:      Cervical: No cervical adenopathy.   Skin:     General: Skin is warm and dry.   Neurological:      Mental Status: She is alert and oriented to person, place, and time.      Cranial Nerves: No cranial nerve deficit.      Deep Tendon Reflexes: Reflexes normal.   Psychiatric:         Mood and Affect: Mood normal.         Behavior: Behavior normal.         Diagnoses and all orders for this visit:    1. Well adult exam (Primary)    2. Migraine with aura and without status migrainosus, not intractable    3. Anxiety    4. Female fertility problem  -     Follicle Stimulating Hormone  -     Testosterone  -     T4, Free  -     TSH  -     DHEA  -     Estrogens, Fractionated    5. Pelvic floor dysfunction  -     Ambulatory Referral to Physical Therapy for Evaluation & Treatment    6. Hyperlipidemia, unspecified hyperlipidemia type  -     Lipid Panel    7. Screening for blood disease  -     CBC & Differential    8. Screening for endocrine disorder  -     Comprehensive Metabolic Panel    Other orders  -     propranolol (INDERAL) 20 MG tablet; Take 1 tablet by mouth 2 (Two) Times a Day.  Dispense: 180 tablet; Refill: 3        Assessment & Plan  1. Well adult exam.  Discussed routine health maintenance, including vaccinations, dental health, diet, exercise, and Pap smears. Addressed mental health. Ordered routine labs for cholesterol, kidney function, liver function, and blood count.    2. Migraines.  Improved with current medication regimen. Prefers  Excedrin and ibuprofen for immediate relief. Continue propranolol for prophylaxis. Refill sent to pharmacy.    3. Anxiety.  Improved with current medication but exacerbated by mother's cancer diagnosis. Continue propranolol. Discussed magnesium for nocturnal anxiety and sleep quality.    4. Fertility concerns.  Consulted gynecologist, no tests ordered last visit. Ordered hormone levels today, follow-up with gynecologist for irregularities.    5. Pelvic floor dysfunction.  Reports constant pelvic pain and difficulty with bladder emptying since D and C. Referred for pelvic floor therapy.        New Medications Ordered This Visit   Medications    propranolol (INDERAL) 20 MG tablet     Sig: Take 1 tablet by mouth 2 (Two) Times a Day.     Dispense:  180 tablet     Refill:  3       No orders of the defined types were placed in this encounter.      Return in about 1 year (around 5/5/2026) for Annual.      Ashley Harrington, DO

## 2025-05-09 LAB
ALBUMIN SERPL-MCNC: 4.7 G/DL (ref 3.5–5.2)
ALBUMIN/GLOB SERPL: 1.8 G/DL
ALP SERPL-CCNC: 106 U/L (ref 39–117)
ALT SERPL-CCNC: 40 U/L (ref 1–33)
AST SERPL-CCNC: 25 U/L (ref 1–32)
BASOPHILS # BLD AUTO: 0.03 10*3/MM3 (ref 0–0.2)
BASOPHILS NFR BLD AUTO: 0.4 % (ref 0–1.5)
BILIRUB SERPL-MCNC: 0.3 MG/DL (ref 0–1.2)
BUN SERPL-MCNC: 11 MG/DL (ref 6–20)
BUN/CREAT SERPL: 15.1 (ref 7–25)
CALCIUM SERPL-MCNC: 10.1 MG/DL (ref 8.6–10.5)
CHLORIDE SERPL-SCNC: 105 MMOL/L (ref 98–107)
CHOLEST SERPL-MCNC: 247 MG/DL (ref 0–200)
CO2 SERPL-SCNC: 23.8 MMOL/L (ref 22–29)
CREAT SERPL-MCNC: 0.73 MG/DL (ref 0.57–1)
DHEA SERPL-MCNC: 187 NG/DL (ref 31–701)
EGFRCR SERPLBLD CKD-EPI 2021: 109.5 ML/MIN/1.73
EOSINOPHIL # BLD AUTO: 0.14 10*3/MM3 (ref 0–0.4)
EOSINOPHIL NFR BLD AUTO: 2 % (ref 0.3–6.2)
ERYTHROCYTE [DISTWIDTH] IN BLOOD BY AUTOMATED COUNT: 11.7 % (ref 12.3–15.4)
ESTRADIOL SERPL-MCNC: 75.8 PG/ML
ESTRONE SERPL-MCNC: 18 PG/ML (ref 27–231)
FSH SERPL-ACNC: 6.9 MIU/ML
GLOBULIN SER CALC-MCNC: 2.6 GM/DL
GLUCOSE SERPL-MCNC: 91 MG/DL (ref 65–99)
HCT VFR BLD AUTO: 43.4 % (ref 34–46.6)
HDLC SERPL-MCNC: 72 MG/DL (ref 40–60)
HGB BLD-MCNC: 14.4 G/DL (ref 12–15.9)
IMM GRANULOCYTES # BLD AUTO: 0.02 10*3/MM3 (ref 0–0.05)
IMM GRANULOCYTES NFR BLD AUTO: 0.3 % (ref 0–0.5)
LDLC SERPL CALC-MCNC: 155 MG/DL (ref 0–100)
LYMPHOCYTES # BLD AUTO: 2.21 10*3/MM3 (ref 0.7–3.1)
LYMPHOCYTES NFR BLD AUTO: 30.9 % (ref 19.6–45.3)
MCH RBC QN AUTO: 30.4 PG (ref 26.6–33)
MCHC RBC AUTO-ENTMCNC: 33.2 G/DL (ref 31.5–35.7)
MCV RBC AUTO: 91.6 FL (ref 79–97)
MONOCYTES # BLD AUTO: 0.54 10*3/MM3 (ref 0.1–0.9)
MONOCYTES NFR BLD AUTO: 7.6 % (ref 5–12)
NEUTROPHILS # BLD AUTO: 4.21 10*3/MM3 (ref 1.7–7)
NEUTROPHILS NFR BLD AUTO: 58.8 % (ref 42.7–76)
NRBC BLD AUTO-RTO: 0 /100 WBC (ref 0–0.2)
PLATELET # BLD AUTO: 322 10*3/MM3 (ref 140–450)
POTASSIUM SERPL-SCNC: 4.3 MMOL/L (ref 3.5–5.2)
PROT SERPL-MCNC: 7.3 G/DL (ref 6–8.5)
RBC # BLD AUTO: 4.74 10*6/MM3 (ref 3.77–5.28)
SODIUM SERPL-SCNC: 140 MMOL/L (ref 136–145)
T4 FREE SERPL-MCNC: 1.3 NG/DL (ref 0.92–1.68)
TESTOST SERPL-MCNC: 25 NG/DL (ref 8–60)
TRIGL SERPL-MCNC: 114 MG/DL (ref 0–150)
TSH SERPL DL<=0.005 MIU/L-ACNC: 3.41 UIU/ML (ref 0.27–4.2)
VLDLC SERPL CALC-MCNC: 20 MG/DL (ref 5–40)
WBC # BLD AUTO: 7.15 10*3/MM3 (ref 3.4–10.8)